# Patient Record
Sex: FEMALE | Race: WHITE | Employment: FULL TIME | URBAN - METROPOLITAN AREA
[De-identification: names, ages, dates, MRNs, and addresses within clinical notes are randomized per-mention and may not be internally consistent; named-entity substitution may affect disease eponyms.]

---

## 2017-01-18 ENCOUNTER — OFFICE VISIT (OUTPATIENT)
Dept: INTERNAL MEDICINE CLINIC | Facility: CLINIC | Age: 42
End: 2017-01-18

## 2017-01-18 VITALS
HEIGHT: 62 IN | DIASTOLIC BLOOD PRESSURE: 86 MMHG | RESPIRATION RATE: 16 BRPM | WEIGHT: 251 LBS | SYSTOLIC BLOOD PRESSURE: 130 MMHG | BODY MASS INDEX: 46.19 KG/M2 | HEART RATE: 80 BPM

## 2017-01-18 DIAGNOSIS — Z51.81 ENCOUNTER FOR THERAPEUTIC DRUG MONITORING: Primary | ICD-10-CM

## 2017-01-18 DIAGNOSIS — E66.01 MORBID OBESITY WITH BMI OF 40.0-44.9, ADULT (HCC): ICD-10-CM

## 2017-01-18 PROCEDURE — 99213 OFFICE O/P EST LOW 20 MIN: CPT | Performed by: NURSE PRACTITIONER

## 2017-01-18 RX ORDER — TOPIRAMATE 25 MG/1
25 TABLET ORAL 2 TIMES DAILY
Qty: 60 TABLET | Refills: 2 | Status: SHIPPED | OUTPATIENT
Start: 2017-01-18 | End: 2017-05-11

## 2017-01-18 RX ORDER — PHENTERMINE HYDROCHLORIDE 37.5 MG/1
37.5 TABLET ORAL
Qty: 30 TABLET | Refills: 0 | Status: SHIPPED | OUTPATIENT
Start: 2017-01-18 | End: 2017-02-23 | Stop reason: DRUGHIGH

## 2017-01-18 RX ORDER — LACTOBACIL 2/BIFIDO 1/S.THERMO 450B CELL
1 PACKET (EA) ORAL EVERY MORNING
Qty: 60 CAPSULE | Refills: 2 | Status: SHIPPED | COMMUNITY
Start: 2017-01-18 | End: 2017-03-19

## 2017-01-18 RX ORDER — PHENTERMINE HYDROCHLORIDE 37.5 MG/1
37.5 TABLET ORAL
COMMUNITY
End: 2017-01-18

## 2017-01-18 NOTE — PROGRESS NOTES
CC: Patient presents with:  Weight Check: 6 pound weight gain       HPI:   Obesity. Patient has not been in since 9/2/16 4 months as had to reschedule appt during our move and then had to cancel and did not reschedule.   She had wrong number for clin Essential hypertension     Encounter for therapeutic drug monitoring     Morbid obesity with BMI of 40.0-44.9, adult ([facility] [address]        REVIEW OF SYSTEMS:   RESPIRATORY: denies shortness of breath   CARDIOVASCULAR: denies chest pain  GI: denies constipation  S about 4 weeks (around 2/15/2017).

## 2017-01-18 NOTE — PATIENT INSTRUCTIONS
Please try to work on the following dietary changes this first month  1. Drink lots of water and cut down on soda consumption if soda drinker  2.  Eat breakfast daily and focus on protein such as greek yogurt with fruit, cottage cheese, string cheese, hard · Fiber is found in foods such as vegetables, fruits, beans, and whole grains. Unlike other carbs, fiber isn’t digested or absorbed. So it doesn’t raise blood sugar.  In fact, fiber can help keep blood sugar from rising too fast. It also helps keep blood ch Protein helps the body build and repair muscle and other tissue. Protein has little or no effect on blood sugar. However, many foods that contain protein also contain saturated fat.  By choosing low-fat protein sources, you can get the benefits of protein w

## 2017-02-23 ENCOUNTER — OFFICE VISIT (OUTPATIENT)
Dept: INTERNAL MEDICINE CLINIC | Facility: CLINIC | Age: 42
End: 2017-02-23

## 2017-02-23 VITALS
BODY MASS INDEX: 45.64 KG/M2 | WEIGHT: 248 LBS | DIASTOLIC BLOOD PRESSURE: 88 MMHG | HEART RATE: 80 BPM | HEIGHT: 62 IN | SYSTOLIC BLOOD PRESSURE: 158 MMHG | RESPIRATION RATE: 16 BRPM

## 2017-02-23 DIAGNOSIS — Z51.81 ENCOUNTER FOR THERAPEUTIC DRUG MONITORING: Primary | ICD-10-CM

## 2017-02-23 DIAGNOSIS — E66.01 MORBID OBESITY WITH BMI OF 40.0-44.9, ADULT (HCC): ICD-10-CM

## 2017-02-23 DIAGNOSIS — F43.9 STRESS: ICD-10-CM

## 2017-02-23 PROCEDURE — 99213 OFFICE O/P EST LOW 20 MIN: CPT | Performed by: NURSE PRACTITIONER

## 2017-02-23 RX ORDER — BUPROPION HYDROCHLORIDE 150 MG/1
150 TABLET, EXTENDED RELEASE ORAL 2 TIMES DAILY
Qty: 60 TABLET | Refills: 1 | Status: SHIPPED | OUTPATIENT
Start: 2017-02-23 | End: 2017-05-11

## 2017-02-23 RX ORDER — PHENTERMINE HYDROCHLORIDE 37.5 MG/1
37.5 TABLET ORAL
Qty: 30 TABLET | Refills: 0 | Status: CANCELLED | OUTPATIENT
Start: 2017-02-23

## 2017-02-23 RX ORDER — PHENTERMINE HYDROCHLORIDE 15 MG/1
15 CAPSULE ORAL EVERY MORNING
Qty: 30 CAPSULE | Refills: 0 | Status: SHIPPED | OUTPATIENT
Start: 2017-02-23 | End: 2017-05-11 | Stop reason: SINTOL

## 2017-02-23 NOTE — PROGRESS NOTES
CC: Patient presents with:  Weight Check: lost 3 pounds       HPI:   Obesiy. Patient tolerating phentermine and topamax however blood pressure still elevated and she did not see PCP since last visit but states she will schedule today.  She has had a l distress, A/O x3  HEENT:  throat is clear, PERRLA  LUNGS: clear to auscultation bilat   CARDIO: RRR without murmur, bp elevated  GI: +BS's    No orders of the defined types were placed in this encounter.         Signed Prescriptions Disp Refills    BuPROPio

## 2017-02-23 NOTE — PATIENT INSTRUCTIONS
Manage Stress with a Healthy Lifestyle  Managing stress is easier if you take good care of yourself. Make time for rest and recreation. Eat healthier meals. Take a walk now and then. And don't forget to treat yourself.  A little down time can go a long wa Exercise helps burn off the negative energy of stress. Doing something active that you enjoy also helps you get away from stressful situations. Try to walk, jog, skate, swim, dance, take a fitness class, or play a team sport on most days.  Or, practice yoga

## 2017-03-21 ENCOUNTER — TELEPHONE (OUTPATIENT)
Dept: INTERNAL MEDICINE CLINIC | Facility: CLINIC | Age: 42
End: 2017-03-21

## 2017-04-03 ENCOUNTER — TELEPHONE (OUTPATIENT)
Dept: INTERNAL MEDICINE CLINIC | Facility: CLINIC | Age: 42
End: 2017-04-03

## 2017-04-03 PROCEDURE — 87086 URINE CULTURE/COLONY COUNT: CPT | Performed by: INTERNAL MEDICINE

## 2017-04-27 ENCOUNTER — OFFICE VISIT (OUTPATIENT)
Dept: INTERNAL MEDICINE CLINIC | Facility: CLINIC | Age: 42
End: 2017-04-27

## 2017-04-27 DIAGNOSIS — E66.01 OBESITY, CLASS III, BMI 40-49.9 (MORBID OBESITY) (HCC): ICD-10-CM

## 2017-04-27 PROCEDURE — 97802 MEDICAL NUTRITION INDIV IN: CPT | Performed by: DIETITIAN, REGISTERED

## 2017-04-28 VITALS — BODY MASS INDEX: 47 KG/M2 | WEIGHT: 255 LBS

## 2017-04-28 NOTE — PROGRESS NOTES
Pt has been ill the past 2 months and when she was prescribed antibiotics she stopped taking weight loss meds.   Does not like to take multiple medications at the same time

## 2017-05-11 ENCOUNTER — OFFICE VISIT (OUTPATIENT)
Dept: INTERNAL MEDICINE CLINIC | Facility: CLINIC | Age: 42
End: 2017-05-11

## 2017-05-11 VITALS
DIASTOLIC BLOOD PRESSURE: 92 MMHG | HEIGHT: 62 IN | SYSTOLIC BLOOD PRESSURE: 138 MMHG | RESPIRATION RATE: 16 BRPM | HEART RATE: 80 BPM | WEIGHT: 249 LBS | BODY MASS INDEX: 45.82 KG/M2

## 2017-05-11 DIAGNOSIS — E66.01 MORBID OBESITY WITH BMI OF 40.0-44.9, ADULT (HCC): ICD-10-CM

## 2017-05-11 DIAGNOSIS — F43.9 STRESS: ICD-10-CM

## 2017-05-11 DIAGNOSIS — Z51.81 ENCOUNTER FOR THERAPEUTIC DRUG MONITORING: Primary | ICD-10-CM

## 2017-05-11 PROCEDURE — 99213 OFFICE O/P EST LOW 20 MIN: CPT | Performed by: NURSE PRACTITIONER

## 2017-05-11 RX ORDER — PHENTERMINE HYDROCHLORIDE 15 MG/1
15 CAPSULE ORAL EVERY MORNING
Qty: 30 CAPSULE | Refills: 0 | Status: CANCELLED | OUTPATIENT
Start: 2017-05-11 | End: 2017-06-10

## 2017-05-11 RX ORDER — TOPIRAMATE 25 MG/1
50 TABLET ORAL 2 TIMES DAILY
Qty: 120 TABLET | Refills: 2 | Status: SHIPPED | OUTPATIENT
Start: 2017-05-11 | End: 2017-08-09

## 2017-05-11 RX ORDER — BUPROPION HYDROCHLORIDE 150 MG/1
150 TABLET, EXTENDED RELEASE ORAL 2 TIMES DAILY
Qty: 60 TABLET | Refills: 1 | Status: SHIPPED | OUTPATIENT
Start: 2017-05-11 | End: 2017-07-10

## 2017-05-11 NOTE — PROGRESS NOTES
CC: Patient presents with:  Weight Check: one pound weight gain       HPI:   Obesity. Patient had been taking phentermine, topamax and wellbutrin.  She did not see PCP regarding blood pressure; she had seen another provider for back pain and they did encounter. Signed Prescriptions Disp Refills    BuPROPion HCl ER, SR, 150 MG Oral Tablet 12 Hr 60 tablet 1      Sig: Take 1 tablet (150 mg total) by mouth 2 (two) times daily.       topiramate 25 MG Oral Tab 120 tablet 2      Sig: Take 2 tablets (50

## 2017-09-20 PROCEDURE — 88305 TISSUE EXAM BY PATHOLOGIST: CPT | Performed by: RADIOLOGY

## 2018-07-07 ENCOUNTER — APPOINTMENT (OUTPATIENT)
Dept: GENERAL RADIOLOGY | Age: 43
End: 2018-07-07
Attending: EMERGENCY MEDICINE
Payer: COMMERCIAL

## 2018-07-07 ENCOUNTER — APPOINTMENT (OUTPATIENT)
Dept: CT IMAGING | Age: 43
End: 2018-07-07
Attending: EMERGENCY MEDICINE
Payer: COMMERCIAL

## 2018-07-07 ENCOUNTER — HOSPITAL ENCOUNTER (EMERGENCY)
Age: 43
Discharge: HOME | End: 2018-07-07
Attending: EMERGENCY MEDICINE
Payer: COMMERCIAL

## 2018-07-07 VITALS
TEMPERATURE: 100 F | HEIGHT: 62 IN | OXYGEN SATURATION: 98 % | DIASTOLIC BLOOD PRESSURE: 74 MMHG | BODY MASS INDEX: 40.48 KG/M2 | HEART RATE: 80 BPM | WEIGHT: 220 LBS | SYSTOLIC BLOOD PRESSURE: 138 MMHG | RESPIRATION RATE: 16 BRPM

## 2018-07-07 DIAGNOSIS — H00.011 HORDEOLUM EXTERNUM OF RIGHT UPPER EYELID: ICD-10-CM

## 2018-07-07 DIAGNOSIS — R20.2 PARESTHESIAS: Primary | ICD-10-CM

## 2018-07-07 LAB
ALBUMIN SERPL-MCNC: 3.3 G/DL (ref 3.5–4.8)
ALP LIVER SERPL-CCNC: 122 U/L (ref 37–98)
ALT SERPL-CCNC: 34 U/L (ref 14–54)
AST SERPL-CCNC: 20 U/L (ref 15–41)
ATRIAL RATE: 90 BPM
BASOPHILS # BLD AUTO: 0.04 X10(3) UL (ref 0–0.1)
BASOPHILS NFR BLD AUTO: 0.5 %
BILIRUB SERPL-MCNC: 0.3 MG/DL (ref 0.1–2)
BUN BLD-MCNC: 12 MG/DL (ref 8–20)
CALCIUM BLD-MCNC: 8.6 MG/DL (ref 8.3–10.3)
CHLORIDE: 107 MMOL/L (ref 101–111)
CO2: 25 MMOL/L (ref 22–32)
CREAT BLD-MCNC: 0.63 MG/DL (ref 0.55–1.02)
EOSINOPHIL # BLD AUTO: 0.23 X10(3) UL (ref 0–0.3)
EOSINOPHIL NFR BLD AUTO: 2.7 %
ERYTHROCYTE [DISTWIDTH] IN BLOOD BY AUTOMATED COUNT: 14.6 % (ref 11.5–16)
GLUCOSE BLD-MCNC: 116 MG/DL (ref 70–99)
HCT VFR BLD AUTO: 37.9 % (ref 34–50)
HGB BLD-MCNC: 12 G/DL (ref 12–16)
IMMATURE GRANULOCYTE COUNT: 0.04 X10(3) UL (ref 0–1)
IMMATURE GRANULOCYTE RATIO %: 0.5 %
LYMPHOCYTES # BLD AUTO: 2.19 X10(3) UL (ref 0.9–4)
LYMPHOCYTES NFR BLD AUTO: 25.4 %
M PROTEIN MFR SERPL ELPH: 7.4 G/DL (ref 6.1–8.3)
MCH RBC QN AUTO: 24.8 PG (ref 27–33.2)
MCHC RBC AUTO-ENTMCNC: 31.7 G/DL (ref 31–37)
MCV RBC AUTO: 78.3 FL (ref 81–100)
MONOCYTES # BLD AUTO: 0.54 X10(3) UL (ref 0.1–1)
MONOCYTES NFR BLD AUTO: 6.3 %
NEUTROPHIL ABS PRELIM: 5.58 X10 (3) UL (ref 1.3–6.7)
NEUTROPHILS # BLD AUTO: 5.58 X10(3) UL (ref 1.3–6.7)
NEUTROPHILS NFR BLD AUTO: 64.6 %
P AXIS: 54 DEGREES
P-R INTERVAL: 134 MS
PLATELET # BLD AUTO: 336 10(3)UL (ref 150–450)
POTASSIUM SERPL-SCNC: 3.7 MMOL/L (ref 3.6–5.1)
Q-T INTERVAL: 366 MS
QRS DURATION: 74 MS
QTC CALCULATION (BEZET): 447 MS
R AXIS: 57 DEGREES
RBC # BLD AUTO: 4.84 X10(6)UL (ref 3.8–5.1)
RED CELL DISTRIBUTION WIDTH-SD: 41.1 FL (ref 35.1–46.3)
SODIUM SERPL-SCNC: 139 MMOL/L (ref 136–144)
T AXIS: 24 DEGREES
TROPONIN: <0.046 NG/ML (ref ?–0.05)
VENTRICULAR RATE: 90 BPM
WBC # BLD AUTO: 8.6 X10(3) UL (ref 4–13)

## 2018-07-07 PROCEDURE — 80053 COMPREHEN METABOLIC PANEL: CPT | Performed by: EMERGENCY MEDICINE

## 2018-07-07 PROCEDURE — 99285 EMERGENCY DEPT VISIT HI MDM: CPT

## 2018-07-07 PROCEDURE — 70450 CT HEAD/BRAIN W/O DYE: CPT | Performed by: EMERGENCY MEDICINE

## 2018-07-07 PROCEDURE — 81025 URINE PREGNANCY TEST: CPT

## 2018-07-07 PROCEDURE — 84484 ASSAY OF TROPONIN QUANT: CPT | Performed by: EMERGENCY MEDICINE

## 2018-07-07 PROCEDURE — 36415 COLL VENOUS BLD VENIPUNCTURE: CPT

## 2018-07-07 PROCEDURE — 85025 COMPLETE CBC W/AUTO DIFF WBC: CPT | Performed by: EMERGENCY MEDICINE

## 2018-07-07 PROCEDURE — 93005 ELECTROCARDIOGRAM TRACING: CPT

## 2018-07-07 PROCEDURE — 71046 X-RAY EXAM CHEST 2 VIEWS: CPT | Performed by: EMERGENCY MEDICINE

## 2018-07-07 PROCEDURE — 93010 ELECTROCARDIOGRAM REPORT: CPT

## 2018-07-07 RX ORDER — ERYTHROMYCIN 5 MG/G
1 OINTMENT OPHTHALMIC EVERY 6 HOURS
Qty: 1 G | Refills: 0 | Status: SHIPPED | OUTPATIENT
Start: 2018-07-07 | End: 2018-07-14

## 2018-07-07 NOTE — ED INITIAL ASSESSMENT (HPI)
Pt c/o numbess to right side of body and right eye redness and swelling since she woke up this am. Sts she has had intermittent numbness x 1 year. C/O intermittent dizziness and headache x 2 weeks.  Denies headache now, just c/o body aches

## 2018-07-07 NOTE — ED PROVIDER NOTES
Patient Seen in: [facility] In [place]    History   Patient presents with:  Numbness  Tingling  Cellulitis (integumentary, infectious)    Stated Complaint: Right eye redness and swelling and right arm \"numbness and tingling\" since l HYSTEROSCOPY  3/21/12: Kathy MONDRAGON      Comment: with BAF and Cholkeri- multiple myomectomy  10/30/2013: LAPAROSCOPIC CHOLECYSTECTOMY      Comment: Procedure: LAPAROSCOPIC CHOLECYSTECTOMY;                 Surgeon: Lalit WILLARD atraumatic. NECK: Neck is supple, there is no nuchal rigidity. No carotid bruits. No masses. Trachea midline. No cervical lymphadenopathy. HEART: Regular rate and rhythm, no murmurs. LUNGS: Clear to auscultation bilaterally.   No Rales, no rhonchi, n blood work obtained. CT the brain performed which did not reveal any acute findings. On reevaluation patient denies any numbness, tingling or weakness.   Patient states she has been having intermittent episodes of numbness and tingling to different parts possible for a visit in 2 days          Medications Prescribed:  Current Discharge Medication List    START taking these medications    erythromycin 5 MG/GM Ophthalmic Ointment  Apply 1 Application to eye every 6 (six) hours.   Qty: 1 g Refills: 0

## 2018-10-05 PROCEDURE — 88305 TISSUE EXAM BY PATHOLOGIST: CPT | Performed by: RADIOLOGY

## 2018-11-09 PROCEDURE — 88175 CYTOPATH C/V AUTO FLUID REDO: CPT | Performed by: OBSTETRICS & GYNECOLOGY

## 2018-11-09 PROCEDURE — 87624 HPV HI-RISK TYP POOLED RSLT: CPT | Performed by: OBSTETRICS & GYNECOLOGY

## 2018-11-19 RX ORDER — IBUPROFEN 200 MG
400 TABLET ORAL EVERY 6 HOURS PRN
COMMUNITY
End: 2019-11-19

## 2018-11-21 ENCOUNTER — TELEPHONE (OUTPATIENT)
Dept: CT IMAGING | Facility: HOSPITAL | Age: 43
End: 2018-11-21

## 2018-11-23 ENCOUNTER — TELEPHONE (OUTPATIENT)
Dept: MAMMOGRAPHY | Facility: HOSPITAL | Age: 43
End: 2018-11-23

## 2018-11-23 NOTE — TELEPHONE ENCOUNTER
LM to call back. Attempting to reach pt to discuss wire LOC procedure done in Mammography Dept prior to surgery with Dr. Miri Hong.

## 2018-11-25 ENCOUNTER — ANESTHESIA EVENT (OUTPATIENT)
Dept: SURGERY | Facility: HOSPITAL | Age: 43
End: 2018-11-25
Payer: COMMERCIAL

## 2018-11-26 ENCOUNTER — HOSPITAL ENCOUNTER (OUTPATIENT)
Dept: MAMMOGRAPHY | Facility: HOSPITAL | Age: 43
Discharge: HOME | End: 2018-11-26
Attending: SURGERY
Payer: COMMERCIAL

## 2018-11-26 ENCOUNTER — HOSPITAL ENCOUNTER (OUTPATIENT)
Facility: HOSPITAL | Age: 43
Setting detail: HOSPITAL OUTPATIENT SURGERY
Discharge: HOME | End: 2018-11-26
Attending: SURGERY | Admitting: SURGERY
Payer: COMMERCIAL

## 2018-11-26 ENCOUNTER — ANESTHESIA (OUTPATIENT)
Dept: SURGERY | Facility: HOSPITAL | Age: 43
End: 2018-11-26
Payer: COMMERCIAL

## 2018-11-26 VITALS
TEMPERATURE: 98 F | HEIGHT: 62 IN | SYSTOLIC BLOOD PRESSURE: 142 MMHG | OXYGEN SATURATION: 98 % | DIASTOLIC BLOOD PRESSURE: 91 MMHG | WEIGHT: 276.69 LBS | BODY MASS INDEX: 50.91 KG/M2 | HEART RATE: 84 BPM | RESPIRATION RATE: 16 BRPM

## 2018-11-26 DIAGNOSIS — N63.10 BREAST MASS, RIGHT: ICD-10-CM

## 2018-11-26 PROCEDURE — 76098 X-RAY EXAM SURGICAL SPECIMEN: CPT | Performed by: SURGERY

## 2018-11-26 PROCEDURE — 88305 TISSUE EXAM BY PATHOLOGIST: CPT | Performed by: SURGERY

## 2018-11-26 PROCEDURE — 19281 PERQ DEVICE BREAST 1ST IMAG: CPT | Performed by: SURGERY

## 2018-11-26 PROCEDURE — 0HBT0ZX EXCISION OF RIGHT BREAST, OPEN APPROACH, DIAGNOSTIC: ICD-10-PCS | Performed by: SURGERY

## 2018-11-26 PROCEDURE — 81025 URINE PREGNANCY TEST: CPT | Performed by: SURGERY

## 2018-11-26 PROCEDURE — 93005 ELECTROCARDIOGRAM TRACING: CPT

## 2018-11-26 PROCEDURE — 93010 ELECTROCARDIOGRAM REPORT: CPT | Performed by: INTERNAL MEDICINE

## 2018-11-26 PROCEDURE — 88344 IMHCHEM/IMCYTCHM EA MLT ANTB: CPT | Performed by: SURGERY

## 2018-11-26 RX ORDER — DIAZEPAM 5 MG/1
5 TABLET ORAL AS NEEDED
Status: DISCONTINUED | OUTPATIENT
Start: 2018-11-26 | End: 2018-11-26 | Stop reason: HOSPADM

## 2018-11-26 RX ORDER — HYDROCODONE BITARTRATE AND ACETAMINOPHEN 5; 325 MG/1; MG/1
TABLET ORAL
Status: DISCONTINUED
Start: 2018-11-26 | End: 2018-11-26

## 2018-11-26 RX ORDER — ONDANSETRON 2 MG/ML
4 INJECTION INTRAMUSCULAR; INTRAVENOUS AS NEEDED
Status: DISCONTINUED | OUTPATIENT
Start: 2018-11-26 | End: 2018-11-26

## 2018-11-26 RX ORDER — ACETAMINOPHEN 500 MG
1000 TABLET ORAL ONCE
COMMUNITY
End: 2019-07-12 | Stop reason: ALTCHOICE

## 2018-11-26 RX ORDER — SODIUM CHLORIDE, SODIUM LACTATE, POTASSIUM CHLORIDE, CALCIUM CHLORIDE 600; 310; 30; 20 MG/100ML; MG/100ML; MG/100ML; MG/100ML
INJECTION, SOLUTION INTRAVENOUS CONTINUOUS
Status: DISCONTINUED | OUTPATIENT
Start: 2018-11-26 | End: 2018-11-26

## 2018-11-26 RX ORDER — MORPHINE SULFATE 4 MG/ML
2 INJECTION, SOLUTION INTRAMUSCULAR; INTRAVENOUS EVERY 5 MIN PRN
Status: DISCONTINUED | OUTPATIENT
Start: 2018-11-26 | End: 2018-11-26

## 2018-11-26 RX ORDER — HYDROCODONE BITARTRATE AND ACETAMINOPHEN 5; 325 MG/1; MG/1
1-2 TABLET ORAL EVERY 4 HOURS PRN
Qty: 20 TABLET | Refills: 0 | Status: SHIPPED | OUTPATIENT
Start: 2018-11-26 | End: 2019-07-12 | Stop reason: ALTCHOICE

## 2018-11-26 RX ORDER — ACETAMINOPHEN 500 MG
1000 TABLET ORAL ONCE
Status: DISCONTINUED | OUTPATIENT
Start: 2018-11-26 | End: 2018-11-26

## 2018-11-26 RX ORDER — NALOXONE HYDROCHLORIDE 0.4 MG/ML
80 INJECTION, SOLUTION INTRAMUSCULAR; INTRAVENOUS; SUBCUTANEOUS AS NEEDED
Status: DISCONTINUED | OUTPATIENT
Start: 2018-11-26 | End: 2018-11-26

## 2018-11-26 RX ORDER — LIDOCAINE HYDROCHLORIDE AND EPINEPHRINE 10; 10 MG/ML; UG/ML
INJECTION, SOLUTION INFILTRATION; PERINEURAL AS NEEDED
Status: DISCONTINUED | OUTPATIENT
Start: 2018-11-26 | End: 2018-11-26 | Stop reason: HOSPADM

## 2018-11-26 RX ORDER — SCOLOPAMINE TRANSDERMAL SYSTEM 1 MG/1
1 PATCH, EXTENDED RELEASE TRANSDERMAL ONCE
Status: DISCONTINUED | OUTPATIENT
Start: 2018-11-26 | End: 2018-11-26

## 2018-11-26 RX ORDER — MORPHINE SULFATE 4 MG/ML
INJECTION, SOLUTION INTRAMUSCULAR; INTRAVENOUS
Status: COMPLETED
Start: 2018-11-26 | End: 2018-11-26

## 2018-11-26 RX ORDER — HYDROCODONE BITARTRATE AND ACETAMINOPHEN 5; 325 MG/1; MG/1
1 TABLET ORAL EVERY 6 HOURS PRN
Status: DISCONTINUED | OUTPATIENT
Start: 2018-11-26 | End: 2018-11-26

## 2018-11-26 RX ORDER — BUPIVACAINE HYDROCHLORIDE 5 MG/ML
INJECTION, SOLUTION EPIDURAL; INTRACAUDAL AS NEEDED
Status: DISCONTINUED | OUTPATIENT
Start: 2018-11-26 | End: 2018-11-26 | Stop reason: HOSPADM

## 2018-11-26 NOTE — H&P
History & Physical Examination    Patient Name: Chandni Lam  MRN: [id number]  CSN: [id number]  YOB: 1975    Diagnosis: right breast mass        Medications Prior to Admission:  acetaminophen 500 MG Oral Tab Take 1,000 mg by mouth one time.  [facility] [address] [place] [address] MAIN OR   • LAPAROSCOPY PROCEDURE UNLISTED     • NEEDLE BIOPSY LEFT  3/1/16    U/S guided bx (4:00, 5cmfn)   • OTHER SURGICAL HISTORY      age 11 and 9 removal of something  on head   • PPTL N/A 11/7/2014    Performed by Nora Simon MD at [address] [place] [address] L+D OR

## 2018-11-26 NOTE — ANESTHESIA POSTPROCEDURE EVALUATION
[address] [facility] Patient Status:  Hospital Outpatient Surgery   Age/Gender 44year old female MRN [id number]   [facility] SURGERY Attending Kade Ba MD   Hosp Day # 0 PCP Zara Villanueva MD       Anesthesia Post-op

## 2018-11-26 NOTE — BRIEF OP NOTE
Pre-Operative Diagnosis: Breast mass, right [N63.10]     Post-Operative Diagnosis: Breast mass, right [N63.10]      Procedure Performed:   Procedure(s):  WIRE LOCALIZATION EXCISION RIGHT BREAST MASS    Surgeon(s) and Role:     * Melody Schaefer MD - [address]

## 2018-11-26 NOTE — OPERATIVE REPORT
[address]    PATIENT'S NAME: [facility]   ATTENDING PHYSICIAN: Gorge Wilson M.D. OPERATING PHYSICIAN: Gorge Wilson M.D.    PATIENT ACCOUNT#:   [de-identified]    LOCATION:  [address] [place] [address] 3 [address] 10  MEDICAL RECORD #:   [id number]       DA

## 2018-11-26 NOTE — IMAGING NOTE
Assisted Dr Robbi Mares with needle loc, wire locked into place and dressing applied, provided education and emotional support, pt tolerated procedure well. Pt transported with wire intact, via wheel chair to OR holding.

## 2019-07-18 ENCOUNTER — ANESTHESIA EVENT (OUTPATIENT)
Dept: SURGERY | Facility: HOSPITAL | Age: 44
End: 2019-07-18

## 2019-07-25 ENCOUNTER — HOSPITAL ENCOUNTER (OUTPATIENT)
Facility: HOSPITAL | Age: 44
Setting detail: HOSPITAL OUTPATIENT SURGERY
Discharge: HOME | End: 2019-07-25
Attending: OPHTHALMOLOGY | Admitting: OPHTHALMOLOGY
Payer: COMMERCIAL

## 2019-07-25 ENCOUNTER — ANESTHESIA (OUTPATIENT)
Dept: SURGERY | Facility: HOSPITAL | Age: 44
End: 2019-07-25

## 2019-07-25 VITALS
SYSTOLIC BLOOD PRESSURE: 148 MMHG | OXYGEN SATURATION: 97 % | RESPIRATION RATE: 16 BRPM | WEIGHT: 272.94 LBS | DIASTOLIC BLOOD PRESSURE: 85 MMHG | BODY MASS INDEX: 50.23 KG/M2 | HEIGHT: 62 IN | TEMPERATURE: 98 F | HEART RATE: 74 BPM

## 2019-07-25 DIAGNOSIS — H50.10 EXOTROPIA OF RIGHT EYE: ICD-10-CM

## 2019-07-25 DIAGNOSIS — H50.21 HYPOTROPIA OF RIGHT EYE: ICD-10-CM

## 2019-07-25 LAB
POCT LOT NUMBER: NORMAL
POCT URINE PREGNANCY: NEGATIVE

## 2019-07-25 PROCEDURE — 08NL0ZZ RELEASE RIGHT EXTRAOCULAR MUSCLE, OPEN APPROACH: ICD-10-PCS | Performed by: OPHTHALMOLOGY

## 2019-07-25 PROCEDURE — 81025 URINE PREGNANCY TEST: CPT | Performed by: OPHTHALMOLOGY

## 2019-07-25 PROCEDURE — 08J0XZZ INSPECTION OF RIGHT EYE, EXTERNAL APPROACH: ICD-10-PCS | Performed by: OPHTHALMOLOGY

## 2019-07-25 PROCEDURE — 08SL0ZZ REPOSITION RIGHT EXTRAOCULAR MUSCLE, OPEN APPROACH: ICD-10-PCS | Performed by: OPHTHALMOLOGY

## 2019-07-25 RX ORDER — ACETAMINOPHEN 500 MG
1000 TABLET ORAL ONCE
COMMUNITY
End: 2019-11-19

## 2019-07-25 RX ORDER — SODIUM CHLORIDE, SODIUM LACTATE, POTASSIUM CHLORIDE, CALCIUM CHLORIDE 600; 310; 30; 20 MG/100ML; MG/100ML; MG/100ML; MG/100ML
INJECTION, SOLUTION INTRAVENOUS CONTINUOUS
Status: DISCONTINUED | OUTPATIENT
Start: 2019-07-25 | End: 2019-07-25

## 2019-07-25 RX ORDER — ACETAMINOPHEN 500 MG
1000 TABLET ORAL ONCE
Status: DISCONTINUED | OUTPATIENT
Start: 2019-07-25 | End: 2019-07-25 | Stop reason: HOSPADM

## 2019-07-25 RX ORDER — DIPHENHYDRAMINE HYDROCHLORIDE 50 MG/ML
12.5 INJECTION INTRAMUSCULAR; INTRAVENOUS AS NEEDED
Status: DISCONTINUED | OUTPATIENT
Start: 2019-07-25 | End: 2019-07-25

## 2019-07-25 RX ORDER — HYDROCODONE BITARTRATE AND ACETAMINOPHEN 5; 325 MG/1; MG/1
1 TABLET ORAL AS NEEDED
Status: COMPLETED | OUTPATIENT
Start: 2019-07-25 | End: 2019-07-25

## 2019-07-25 RX ORDER — ONDANSETRON 2 MG/ML
4 INJECTION INTRAMUSCULAR; INTRAVENOUS AS NEEDED
Status: DISCONTINUED | OUTPATIENT
Start: 2019-07-25 | End: 2019-07-25

## 2019-07-25 RX ORDER — MEPERIDINE HYDROCHLORIDE 25 MG/ML
12.5 INJECTION INTRAMUSCULAR; INTRAVENOUS; SUBCUTANEOUS AS NEEDED
Status: DISCONTINUED | OUTPATIENT
Start: 2019-07-25 | End: 2019-07-25

## 2019-07-25 RX ORDER — SCOLOPAMINE TRANSDERMAL SYSTEM 1 MG/1
1 PATCH, EXTENDED RELEASE TRANSDERMAL ONCE
Status: DISCONTINUED | OUTPATIENT
Start: 2019-07-25 | End: 2019-07-25

## 2019-07-25 RX ORDER — PHENYLEPHRINE HCL 2.5 %
DROPS OPHTHALMIC (EYE) AS NEEDED
Status: DISCONTINUED | OUTPATIENT
Start: 2019-07-25 | End: 2019-07-25 | Stop reason: HOSPADM

## 2019-07-25 RX ORDER — NALOXONE HYDROCHLORIDE 0.4 MG/ML
80 INJECTION, SOLUTION INTRAMUSCULAR; INTRAVENOUS; SUBCUTANEOUS AS NEEDED
Status: DISCONTINUED | OUTPATIENT
Start: 2019-07-25 | End: 2019-07-25

## 2019-07-25 RX ORDER — BALANCED SALT SOLUTION 6.4; .75; .48; .3; 3.9; 1.7 MG/ML; MG/ML; MG/ML; MG/ML; MG/ML; MG/ML
SOLUTION OPHTHALMIC AS NEEDED
Status: DISCONTINUED | OUTPATIENT
Start: 2019-07-25 | End: 2019-07-25 | Stop reason: HOSPADM

## 2019-07-25 RX ORDER — HYDROMORPHONE HYDROCHLORIDE 1 MG/ML
0.4 INJECTION, SOLUTION INTRAMUSCULAR; INTRAVENOUS; SUBCUTANEOUS EVERY 5 MIN PRN
Status: DISCONTINUED | OUTPATIENT
Start: 2019-07-25 | End: 2019-07-25

## 2019-07-25 RX ORDER — HYDROCODONE BITARTRATE AND ACETAMINOPHEN 5; 325 MG/1; MG/1
2 TABLET ORAL AS NEEDED
Status: COMPLETED | OUTPATIENT
Start: 2019-07-25 | End: 2019-07-25

## 2019-07-25 RX ORDER — SCOLOPAMINE TRANSDERMAL SYSTEM 1 MG/1
PATCH, EXTENDED RELEASE TRANSDERMAL
Status: DISCONTINUED
Start: 2019-07-25 | End: 2019-07-25

## 2019-07-25 NOTE — BRIEF OP NOTE
Pre-Operative Diagnosis: Exotropia of right eye [H50.10]  Hypotropia of right eye [H50.21]     Post-Operative Diagnosis: Exotropia of right eye [H50.10]Hypotropia of right eye [H50.21]      Procedure Performed:   Procedure(s):  RIGHT EYE EXPLORATION  AND R

## 2019-07-25 NOTE — ANESTHESIA PREPROCEDURE EVALUATION
PRE-OP EVALUATION    Patient Name: Pia Allred    Pre-op Diagnosis: Exotropia of right eye [H50.10]  Hypotropia of right eye [H50.21]    Procedure(s):  RIGHT LATERAL RECTUS RECESSION AND RIGHT MEDIAL RECTUS RESECTION    Surgeon(s) and Role:     * Sin Performed by Mario Lino MD at [facility] MAIN OR   • LAPAROSCOPY PROCEDURE UNLISTED     • NEEDLE BIOPSY LEFT  3/1/16    U/S guided bx (4:00, 5cmfn)   • OTHER SURGICAL HISTORY      age 11 and 9 removal of something  on head   • OTHER SURGICAL HISTORY  11/26/201

## 2019-07-25 NOTE — ANESTHESIA POSTPROCEDURE EVALUATION
[address] [facility] Patient Status:  Hospital Outpatient Surgery   Age/Gender 38year old female MRN [id number]   Location [facility] Attending Angeline Maier MD   Hosp Day # 0 PCP kSyler Diana MD       A

## 2019-07-25 NOTE — INTERVAL H&P NOTE
Pre-op Diagnosis: Exotropia of right eye [H50.10]  Hypotropia of right eye [H50.21]    The above referenced H&P was reviewed by Ayad Cunningham MD on 7/25/2019, the patient was examined and no significant changes have occurred in the patient's condition sinc

## 2019-07-25 NOTE — DISCHARGE SUMMARY
Outpatient Surgery Brief Discharge Summary         Patient ID:  Ronan Morris  [id number]  38year old  6/28/1975    Discharge Diagnoses: Exotropia of right eye [H50.10]Hypotropia of right eye [H50.21]    Procedures: Right eye exploration and right medial re

## 2019-07-25 NOTE — OPERATIVE REPORT
Surgeon: Genaro Kennedy MD  Pre-Operative Diagnosis: right exotropia, right hypotropia  Post-Operative Diagnosis: same  Operation Performed:  1) right eye exploration  2) scar tissue release  3) right eye forced ductions   4) right medial rectus advancement isolated first with a Clemens hook and then with a Green hook.  The muscle was found to be at a position 13.0 mm posterior to the limbus, suggesting a prior recession of 7.5 mm had been performed (assuming a natural insertion site of 5.5 mm posterior to the

## 2019-11-19 PROBLEM — R06.00 DYSPNEA ON EXERTION: Status: ACTIVE | Noted: 2019-11-19

## 2019-11-19 PROBLEM — D50.9 IRON DEFICIENCY ANEMIA, UNSPECIFIED IRON DEFICIENCY ANEMIA TYPE: Status: ACTIVE | Noted: 2019-11-19

## 2019-11-19 PROBLEM — G44.229 CHRONIC TENSION-TYPE HEADACHE, NOT INTRACTABLE: Status: ACTIVE | Noted: 2019-11-19

## 2019-11-19 PROBLEM — F41.9 ANXIETY: Status: ACTIVE | Noted: 2019-11-19

## 2019-11-19 PROBLEM — R79.89 LFT ELEVATION: Status: ACTIVE | Noted: 2019-11-19

## 2019-11-19 PROBLEM — R06.09 DYSPNEA ON EXERTION: Status: ACTIVE | Noted: 2019-11-19

## 2019-11-19 PROBLEM — R73.01 IFG (IMPAIRED FASTING GLUCOSE): Status: ACTIVE | Noted: 2019-11-19

## 2019-11-26 ENCOUNTER — HOSPITAL ENCOUNTER (EMERGENCY)
Facility: HOSPITAL | Age: 44
Discharge: HOME | End: 2019-11-26
Attending: EMERGENCY MEDICINE
Payer: COMMERCIAL

## 2019-11-26 VITALS
OXYGEN SATURATION: 100 % | RESPIRATION RATE: 25 BRPM | HEIGHT: 62 IN | SYSTOLIC BLOOD PRESSURE: 128 MMHG | TEMPERATURE: 98 F | HEART RATE: 75 BPM | WEIGHT: 270 LBS | BODY MASS INDEX: 49.69 KG/M2 | DIASTOLIC BLOOD PRESSURE: 75 MMHG

## 2019-11-26 DIAGNOSIS — K52.9 ENTERITIS: Primary | ICD-10-CM

## 2019-11-26 PROCEDURE — 99284 EMERGENCY DEPT VISIT MOD MDM: CPT

## 2019-11-26 PROCEDURE — 83690 ASSAY OF LIPASE: CPT | Performed by: EMERGENCY MEDICINE

## 2019-11-26 PROCEDURE — 80053 COMPREHEN METABOLIC PANEL: CPT | Performed by: EMERGENCY MEDICINE

## 2019-11-26 PROCEDURE — 85025 COMPLETE CBC W/AUTO DIFF WBC: CPT | Performed by: EMERGENCY MEDICINE

## 2019-11-26 PROCEDURE — 96360 HYDRATION IV INFUSION INIT: CPT

## 2019-11-26 PROCEDURE — 96361 HYDRATE IV INFUSION ADD-ON: CPT

## 2019-11-26 RX ORDER — ONDANSETRON 4 MG/1
4 TABLET, ORALLY DISINTEGRATING ORAL EVERY 4 HOURS PRN
Qty: 10 TABLET | Refills: 0 | Status: SHIPPED | OUTPATIENT
Start: 2019-11-26 | End: 2019-12-03

## 2019-11-26 NOTE — ED INITIAL ASSESSMENT (HPI)
Pt c/o black colored diarrhea since yesterday. Pt c/o feeling weak and lightheaded. Denies c/o abdominal pain.

## 2019-11-26 NOTE — ED PROVIDER NOTES
Patient Seen in: [facility] Emergency Department      History   Patient presents with:  Nausea/Vomiting/Diarrhea (gastrointestinal)    Stated Complaint: diarrhea since yesterday morning    HPI    Patient is a 60-year-old female who woke up about 4 A RESECTION/ RESESSION Right 7/25/2019    Performed by Celi Velazco MD at [facility] MAIN OR   • HYSTEROSCOPY     • HYSTEROSCOPY,LYSIS INTRAUTER [address]  3/21/12    with BAF and Cholkeri- multiple myomectomy   • LAPAROSCOPIC CHOLECYSTECTOMY N/A 10/30/2013    Perfor are normal.      Palpations: Abdomen is soft. Comments: Nontender/nondistended. Musculoskeletal: Normal range of motion. Skin:     General: Skin is warm and dry.    Neurological:      Mental Status: She is alert and oriented to person, place, and t She is comfortable going home.         Disposition and Plan     Clinical Impression:  Enteritis  (primary encounter diagnosis)    Disposition:  Discharge  11/26/2019  4:44 pm    Follow-up:  Rosa Rausch MD  [address] [place] [address]

## 2019-12-19 PROBLEM — H61.21 IMPACTED CERUMEN OF RIGHT EAR: Status: ACTIVE | Noted: 2019-12-19

## 2019-12-19 PROBLEM — M54.2 NECK PAIN, MUSCULOSKELETAL: Status: ACTIVE | Noted: 2019-12-19

## 2019-12-19 PROBLEM — R10.9 FLANK PAIN: Status: ACTIVE | Noted: 2019-12-19

## 2020-06-06 PROBLEM — R73.03 PREDIABETES: Status: ACTIVE | Noted: 2020-06-06

## 2020-06-06 PROBLEM — D64.9 ANEMIA, UNSPECIFIED TYPE: Status: ACTIVE | Noted: 2020-06-06

## 2020-06-06 PROBLEM — R05.3 CHRONIC COUGH: Status: ACTIVE | Noted: 2020-06-06

## 2020-06-06 PROBLEM — N92.0 MENORRHAGIA WITH REGULAR CYCLE: Status: ACTIVE | Noted: 2020-06-06

## 2020-11-24 ENCOUNTER — HOSPITAL ENCOUNTER (EMERGENCY)
Facility: HOSPITAL | Age: 45
Discharge: HOME | End: 2020-11-24
Attending: EMERGENCY MEDICINE
Payer: COMMERCIAL

## 2020-11-24 ENCOUNTER — APPOINTMENT (OUTPATIENT)
Dept: GENERAL RADIOLOGY | Facility: HOSPITAL | Age: 45
End: 2020-11-24
Attending: EMERGENCY MEDICINE
Payer: COMMERCIAL

## 2020-11-24 ENCOUNTER — APPOINTMENT (OUTPATIENT)
Dept: GENERAL RADIOLOGY | Facility: HOSPITAL | Age: 45
End: 2020-11-24
Payer: COMMERCIAL

## 2020-11-24 VITALS
BODY MASS INDEX: 44.71 KG/M2 | WEIGHT: 246.06 LBS | HEART RATE: 108 BPM | SYSTOLIC BLOOD PRESSURE: 155 MMHG | DIASTOLIC BLOOD PRESSURE: 76 MMHG | RESPIRATION RATE: 22 BRPM | TEMPERATURE: 101 F | OXYGEN SATURATION: 97 % | HEIGHT: 62.01 IN

## 2020-11-24 DIAGNOSIS — J12.82 PNEUMONIA DUE TO COVID-19 VIRUS: Primary | ICD-10-CM

## 2020-11-24 DIAGNOSIS — D64.9 ANEMIA, UNSPECIFIED TYPE: ICD-10-CM

## 2020-11-24 DIAGNOSIS — U07.1 PNEUMONIA DUE TO COVID-19 VIRUS: Primary | ICD-10-CM

## 2020-11-24 PROCEDURE — 82550 ASSAY OF CK (CPK): CPT | Performed by: EMERGENCY MEDICINE

## 2020-11-24 PROCEDURE — 99285 EMERGENCY DEPT VISIT HI MDM: CPT

## 2020-11-24 PROCEDURE — 99284 EMERGENCY DEPT VISIT MOD MDM: CPT

## 2020-11-24 PROCEDURE — 81001 URINALYSIS AUTO W/SCOPE: CPT | Performed by: EMERGENCY MEDICINE

## 2020-11-24 PROCEDURE — 99453 REM MNTR PHYSIOL PARAM SETUP: CPT

## 2020-11-24 PROCEDURE — 83880 ASSAY OF NATRIURETIC PEPTIDE: CPT | Performed by: EMERGENCY MEDICINE

## 2020-11-24 PROCEDURE — 96361 HYDRATE IV INFUSION ADD-ON: CPT

## 2020-11-24 PROCEDURE — 36415 COLL VENOUS BLD VENIPUNCTURE: CPT

## 2020-11-24 PROCEDURE — 84145 PROCALCITONIN (PCT): CPT | Performed by: EMERGENCY MEDICINE

## 2020-11-24 PROCEDURE — 84484 ASSAY OF TROPONIN QUANT: CPT | Performed by: EMERGENCY MEDICINE

## 2020-11-24 PROCEDURE — 80053 COMPREHEN METABOLIC PANEL: CPT | Performed by: EMERGENCY MEDICINE

## 2020-11-24 PROCEDURE — 80053 COMPREHEN METABOLIC PANEL: CPT

## 2020-11-24 PROCEDURE — 86140 C-REACTIVE PROTEIN: CPT | Performed by: EMERGENCY MEDICINE

## 2020-11-24 PROCEDURE — 85379 FIBRIN DEGRADATION QUANT: CPT | Performed by: EMERGENCY MEDICINE

## 2020-11-24 PROCEDURE — 96374 THER/PROPH/DIAG INJ IV PUSH: CPT

## 2020-11-24 PROCEDURE — 85025 COMPLETE CBC W/AUTO DIFF WBC: CPT

## 2020-11-24 PROCEDURE — 82728 ASSAY OF FERRITIN: CPT | Performed by: EMERGENCY MEDICINE

## 2020-11-24 PROCEDURE — 71045 X-RAY EXAM CHEST 1 VIEW: CPT

## 2020-11-24 PROCEDURE — 83615 LACTATE (LD) (LDH) ENZYME: CPT | Performed by: EMERGENCY MEDICINE

## 2020-11-24 PROCEDURE — 85025 COMPLETE CBC W/AUTO DIFF WBC: CPT | Performed by: EMERGENCY MEDICINE

## 2020-11-24 RX ORDER — KETOROLAC TROMETHAMINE 30 MG/ML
15 INJECTION, SOLUTION INTRAMUSCULAR; INTRAVENOUS ONCE
Status: COMPLETED | OUTPATIENT
Start: 2020-11-24 | End: 2020-11-24

## 2020-11-24 RX ORDER — ACETAMINOPHEN 500 MG
1000 TABLET ORAL ONCE
Status: COMPLETED | OUTPATIENT
Start: 2020-11-24 | End: 2020-11-24

## 2020-11-24 RX ORDER — SODIUM CHLORIDE 9 MG/ML
INJECTION, SOLUTION INTRAVENOUS CONTINUOUS
Status: DISCONTINUED | OUTPATIENT
Start: 2020-11-24 | End: 2020-11-24

## 2020-11-24 NOTE — ED PROVIDER NOTES
Patient Seen in: [facility] Emergency Department      History   Patient presents with:  Covid  Nausea/Vomiting/Diarrhea  Cough/URI  Fever    Stated Complaint: COVID positive 11/17; vomiting, diarrhea, fevers, body aches, headache, cough t*    HPI RESESSION Right 7/25/2019    Performed by Rosa Vincent MD at [facility] MAIN OR   • HYSTEROSCOPY     • HYSTEROSCOPY,LYSIS INTRAUTER [address] [place]  3/21/12    with BAF and Cholkeri- multiple myomectomy   • LAPAROSCOPIC CHOLECYSTECTOMY N/A 10/30/2013    Performed by Bradly Camarena COMP METABOLIC PANEL (14) - Abnormal; Notable for the following components:       Result Value    Potassium 3.3 (*)     Creatinine 0.54 (*)     BUN/CREA Ratio 24.1 (*)     Calcium, Total 8.2 (*)     AST 39 (*)     Alkaline Phosphatase 104 (*)     Albumin RAINBOW DRAW BLUE   RAINBOW DRAW LAVENDER   RAINBOW DRAW LIGHT GREEN   RAINBOW DRAW GOLD   BLOOD CULTURE   BLOOD CULTURE   SPUTUM CULTURE            Xr Chest Ap Portable  (cpt=71045)    Result Date: 11/24/2020  PROCEDURE:  XR CHEST AP PORTABLE  (CPT=7104 patient has evidence of COVID-19 pneumonia and low oxygen saturation. There is concern for gradual decline at home.  As a result, a pulse oximetry device was given to the patient/caregiver and clear instructions relayed on how to use the device, interpret t

## 2021-03-16 PROBLEM — E55.9 VITAMIN D DEFICIENCY: Status: ACTIVE | Noted: 2021-03-16

## 2021-03-16 PROBLEM — N93.8 DUB (DYSFUNCTIONAL UTERINE BLEEDING): Status: ACTIVE | Noted: 2021-03-16

## 2021-06-01 ENCOUNTER — HOSPITAL ENCOUNTER (OUTPATIENT)
Facility: HOSPITAL | Age: 46
Setting detail: OBSERVATION
Discharge: HOME | End: 2021-06-02
Attending: EMERGENCY MEDICINE | Admitting: INTERNAL MEDICINE
Payer: COMMERCIAL

## 2021-06-01 ENCOUNTER — APPOINTMENT (OUTPATIENT)
Dept: GENERAL RADIOLOGY | Facility: HOSPITAL | Age: 46
End: 2021-06-01
Attending: HOSPITALIST
Payer: COMMERCIAL

## 2021-06-01 DIAGNOSIS — D64.9 SYMPTOMATIC ANEMIA: Primary | ICD-10-CM

## 2021-06-01 PROCEDURE — 86920 COMPATIBILITY TEST SPIN: CPT

## 2021-06-01 PROCEDURE — 81003 URINALYSIS AUTO W/O SCOPE: CPT | Performed by: EMERGENCY MEDICINE

## 2021-06-01 PROCEDURE — 96361 HYDRATE IV INFUSION ADD-ON: CPT

## 2021-06-01 PROCEDURE — 83550 IRON BINDING TEST: CPT | Performed by: EMERGENCY MEDICINE

## 2021-06-01 PROCEDURE — 30233H1 TRANSFUSION OF NONAUTOLOGOUS WHOLE BLOOD INTO PERIPHERAL VEIN, PERCUTANEOUS APPROACH: ICD-10-PCS | Performed by: INTERNAL MEDICINE

## 2021-06-01 PROCEDURE — 36430 TRANSFUSION BLD/BLD COMPNT: CPT

## 2021-06-01 PROCEDURE — 85025 COMPLETE CBC W/AUTO DIFF WBC: CPT | Performed by: EMERGENCY MEDICINE

## 2021-06-01 PROCEDURE — 86850 RBC ANTIBODY SCREEN: CPT | Performed by: EMERGENCY MEDICINE

## 2021-06-01 PROCEDURE — 71046 X-RAY EXAM CHEST 2 VIEWS: CPT | Performed by: HOSPITALIST

## 2021-06-01 PROCEDURE — 96360 HYDRATION IV INFUSION INIT: CPT

## 2021-06-01 PROCEDURE — 99291 CRITICAL CARE FIRST HOUR: CPT

## 2021-06-01 PROCEDURE — 86901 BLOOD TYPING SEROLOGIC RH(D): CPT | Performed by: EMERGENCY MEDICINE

## 2021-06-01 PROCEDURE — 85018 HEMOGLOBIN: CPT | Performed by: INTERNAL MEDICINE

## 2021-06-01 PROCEDURE — 81025 URINE PREGNANCY TEST: CPT

## 2021-06-01 PROCEDURE — 83540 ASSAY OF IRON: CPT | Performed by: EMERGENCY MEDICINE

## 2021-06-01 PROCEDURE — 86900 BLOOD TYPING SEROLOGIC ABO: CPT | Performed by: EMERGENCY MEDICINE

## 2021-06-01 PROCEDURE — 80053 COMPREHEN METABOLIC PANEL: CPT | Performed by: EMERGENCY MEDICINE

## 2021-06-01 PROCEDURE — 87040 BLOOD CULTURE FOR BACTERIA: CPT | Performed by: HOSPITALIST

## 2021-06-01 PROCEDURE — 82728 ASSAY OF FERRITIN: CPT | Performed by: EMERGENCY MEDICINE

## 2021-06-01 RX ORDER — ACETAMINOPHEN 160 MG/5ML
650 SOLUTION ORAL EVERY 4 HOURS PRN
Status: DISCONTINUED | OUTPATIENT
Start: 2021-06-01 | End: 2021-06-02

## 2021-06-01 RX ORDER — LOSARTAN POTASSIUM AND HYDROCHLOROTHIAZIDE 25; 100 MG/1; MG/1
1 TABLET ORAL DAILY
Status: DISCONTINUED | OUTPATIENT
Start: 2021-06-01 | End: 2021-06-01 | Stop reason: SDUPTHER

## 2021-06-01 RX ORDER — ACETAMINOPHEN 160 MG/5ML
325 SOLUTION ORAL EVERY 4 HOURS PRN
Status: DISCONTINUED | OUTPATIENT
Start: 2021-06-01 | End: 2021-06-02

## 2021-06-01 RX ORDER — ACETAMINOPHEN 500 MG
1000 TABLET ORAL ONCE
Status: COMPLETED | OUTPATIENT
Start: 2021-06-01 | End: 2021-06-01

## 2021-06-01 RX ORDER — ERGOCALCIFEROL (VITAMIN D2) 1250 MCG
50000 CAPSULE ORAL WEEKLY
COMMUNITY
Start: 2021-05-25 | End: 2021-11-03

## 2021-06-01 RX ORDER — IBUPROFEN 200 MG
600 TABLET ORAL EVERY 6 HOURS PRN
COMMUNITY
End: 2021-11-03

## 2021-06-01 RX ORDER — ACETAMINOPHEN 500 MG
TABLET ORAL
Status: COMPLETED
Start: 2021-06-01 | End: 2021-06-01

## 2021-06-01 RX ORDER — HYDRALAZINE HYDROCHLORIDE 20 MG/ML
10 INJECTION INTRAMUSCULAR; INTRAVENOUS EVERY 8 HOURS PRN
Status: DISCONTINUED | OUTPATIENT
Start: 2021-06-01 | End: 2021-06-02

## 2021-06-01 RX ORDER — PANTOPRAZOLE SODIUM 40 MG/1
40 TABLET, DELAYED RELEASE ORAL
Status: DISCONTINUED | OUTPATIENT
Start: 2021-06-02 | End: 2021-06-02

## 2021-06-01 NOTE — ED INITIAL ASSESSMENT (HPI)
Chronic vag bleeding d/t fibroids, hysterectomy scheduled. Bleeding worse x 2 days with very large clots, shortness of breath, fatigue, and dizziness.

## 2021-06-02 VITALS
HEART RATE: 86 BPM | SYSTOLIC BLOOD PRESSURE: 158 MMHG | DIASTOLIC BLOOD PRESSURE: 77 MMHG | TEMPERATURE: 98 F | HEIGHT: 62 IN | BODY MASS INDEX: 47.59 KG/M2 | RESPIRATION RATE: 14 BRPM | WEIGHT: 258.63 LBS | OXYGEN SATURATION: 98 %

## 2021-06-02 PROCEDURE — 85018 HEMOGLOBIN: CPT | Performed by: INTERNAL MEDICINE

## 2021-06-02 PROCEDURE — 80048 BASIC METABOLIC PNL TOTAL CA: CPT | Performed by: HOSPITALIST

## 2021-06-02 PROCEDURE — 36430 TRANSFUSION BLD/BLD COMPNT: CPT

## 2021-06-02 PROCEDURE — 85027 COMPLETE CBC AUTOMATED: CPT | Performed by: HOSPITALIST

## 2021-06-02 RX ORDER — SODIUM CHLORIDE 9 MG/ML
INJECTION, SOLUTION INTRAVENOUS ONCE
Status: COMPLETED | OUTPATIENT
Start: 2021-06-02 | End: 2021-06-02

## 2021-06-02 RX ORDER — MELATONIN
325 2 TIMES DAILY WITH MEALS
Qty: 60 TABLET | Refills: 0 | Status: SHIPPED | OUTPATIENT
Start: 2021-06-02 | End: 2021-11-03

## 2021-06-02 RX ORDER — KETOROLAC TROMETHAMINE 30 MG/ML
30 INJECTION, SOLUTION INTRAMUSCULAR; INTRAVENOUS EVERY 6 HOURS PRN
Status: DISCONTINUED | OUTPATIENT
Start: 2021-06-02 | End: 2021-06-02

## 2021-06-02 RX ORDER — KETOROLAC TROMETHAMINE 15 MG/ML
15 INJECTION, SOLUTION INTRAMUSCULAR; INTRAVENOUS EVERY 6 HOURS PRN
Status: DISCONTINUED | OUTPATIENT
Start: 2021-06-02 | End: 2021-06-02

## 2021-06-02 RX ORDER — DOCUSATE SODIUM 100 MG/1
100 CAPSULE, LIQUID FILLED ORAL 2 TIMES DAILY PRN
Qty: 60 CAPSULE | Refills: 0 | Status: SHIPPED | COMMUNITY
Start: 2021-06-02 | End: 2021-11-03

## 2021-06-02 NOTE — PROGRESS NOTES
NURSING ADMISSION NOTE      Patient admitted via [address] to room, 520. Safety precautions initiated. Bed in low position. Call light in reach. Admission navigator/PTA medication list complete. Chronic hx of severe menorrhagia. Hgb 5.9.  On

## 2021-06-02 NOTE — H&P
[facility] Hospitalist History and Physical      Patient presents with:  Difficulty Breathing  Fatigue       PCP: Zara Villanueva MD      History of Present Illness: Patient is a 41year old female with PMH sig for severe menorrhagia, HTN , GERD, and morbi 10/30/2013    Procedure: LAPAROSCOPIC CHOLECYSTECTOMY;  Surgeon: Ollie Moreno;   Location: [facility] MAIN OR   • NEEDLE BIOPSY LEFT  3/1/16    U/S guided bx (4:00, 5cmfn)   • OTHER SURGICAL HISTORY      age 11 and 9 removal of something  on head   • OTHER SURGICA nontender, nondistended, no organomegaly, bowel sounds present. Morbidly obese.    MSK: Full range of motion in extremities, no clubbing, no cyanosis  Skin: no rashes or lesions  Neuro:  Grossly intact, no focal deficits      Data Review:    LABS:   Lab Re ferrous sulfate 325 mg po BID w/ meals at home  - f/u with OB/GYN for hysterectomy     # Acute on chronic low back pain  - pt with lumbar DJD noted as early as 2012, has had ELSLI in the past  - severe anemia likely exacerbating her symptoms   - recommend tr

## 2021-06-02 NOTE — H&P
SIGNIFICANT HISTORY:     Medical History: ·   Past Medical History:   Diagnosis Date   • Anesthesia complication     passed out post surgery   • Chronic rhinitis    • Difficult intubation     was told mouth is too small,old anesthesia record under media Never used    Substance and Sexual Activity      Alcohol use: No      Drug use: No      Sexual activity: Yes        Partners: Male        Birth control/protection: Tubal Ligation    Other Topics      Concerns:        Not on file    Social History Narrative incontinence  MUSCULOSKELETAL: no joint complaints upper or lower extremities  PSYCHE: no symptoms of depression or anxiety  HEMATOLOGY: denies hx anemia; denies bruising or excessive bleeding  ENDOCRINE:  denies unexpected wt gain or wt loss  ALLERGY/IMM.

## 2021-06-02 NOTE — ED PROVIDER NOTES
Patient Seen in: [facility] Emergency Department      History   Patient presents with:  Difficulty Breathing  Fatigue    Stated Complaint: shortness of breath with exertion, fatigue, history of low HgB    HPI/Subjective:   HPI    80-year-old female ADHSNS  3/21/12    with BAF and Cholkeri- multiple myomectomy   • LAPAROSCOPIC CHOLECYSTECTOMY  10/30/2013    Procedure: LAPAROSCOPIC CHOLECYSTECTOMY;  Surgeon: Perry Kimbrough;   Location: [address] [place] [address]   • NEEDLE BIOPSY LEFT  3/1/16    U/S guided bx (4:00, 5c components within normal limits   URINALYSIS WITH CULTURE REFLEX - Abnormal; Notable for the following components:    Squamous Epi.  Cells Few (*)     All other components within normal limits   CBC W/ DIFFERENTIAL - Abnormal; Notable for the following comp transfusion in the ER. This involved direct patient intervention, complex decision making, and/or extensive discussions with the patient, family, and clinical staff. MDM      52-year-old female presents to the ER with symptomatic anemia.   She is tac

## 2021-06-02 NOTE — DISCHARGE PLANNING
NURSING DISCHARGE NOTE    Discharged Home via Wheelchair. Accompanied by Spouse and Support staff  Belongings Taken by patient/family. Discharge navigator complete. Discharge instructions reviewed with patient & spouse at bedside.   All questions & co

## 2021-06-03 ENCOUNTER — PATIENT OUTREACH (OUTPATIENT)
Dept: CASE MANAGEMENT | Age: 46
End: 2021-06-03

## 2021-06-03 NOTE — PROGRESS NOTES
1st attempt PCP apt request    [facility]  [address] [place] [address]  [phone]  Apt made Tues.  June 8th @3pm

## 2021-06-07 RX ORDER — ACETAMINOPHEN 500 MG
1000 TABLET ORAL ONCE
Status: CANCELLED | OUTPATIENT
Start: 2021-06-07 | End: 2021-06-07

## 2021-06-15 ENCOUNTER — LAB ENCOUNTER (OUTPATIENT)
Dept: LAB | Facility: HOSPITAL | Age: 46
End: 2021-06-15
Attending: OBSTETRICS & GYNECOLOGY
Payer: COMMERCIAL

## 2021-06-15 DIAGNOSIS — Z01.818 PRE-OP TESTING: ICD-10-CM

## 2021-06-17 NOTE — H&P
[facility]    History and Physical    Taylor Dao Patient Status:  Hospital Outpatient Surgery    1975 MRN [id number]   [facility] SURGERY Attending Felicia Baker MD   Hosp Day # 0 PCP Lucille Dawson MD     Date:   OTHER SURGICAL HISTORY      age 11 and 7 removal of something  on head   • OTHER SURGICAL HISTORY  11/26/2018    Wire loc exc right breast mass   • TUBAL LIGATION       Family History   Problem Relation Age of Onset   • Heart Disorder Paternal Grandmother RPR Nonreactive 11/07/2014    B12 439 06/06/2020     No results found. Assessment/Plan:     * No active hospital problems.  *  Plan total laparoscopic hysterectomy, bilateral salpingectomy, cystoscopy  Ancef 2 g  SCDs            Vika Biswas MD  6

## 2021-06-18 ENCOUNTER — HOSPITAL ENCOUNTER (OUTPATIENT)
Facility: HOSPITAL | Age: 46
Setting detail: HOSPITAL OUTPATIENT SURGERY
Discharge: HOME | End: 2021-06-18
Attending: OBSTETRICS & GYNECOLOGY | Admitting: OBSTETRICS & GYNECOLOGY
Payer: COMMERCIAL

## 2021-06-18 ENCOUNTER — ANESTHESIA (OUTPATIENT)
Dept: SURGERY | Facility: HOSPITAL | Age: 46
End: 2021-06-18
Payer: COMMERCIAL

## 2021-06-18 ENCOUNTER — ANESTHESIA EVENT (OUTPATIENT)
Dept: SURGERY | Facility: HOSPITAL | Age: 46
End: 2021-06-18
Payer: COMMERCIAL

## 2021-06-18 VITALS
RESPIRATION RATE: 16 BRPM | OXYGEN SATURATION: 94 % | SYSTOLIC BLOOD PRESSURE: 157 MMHG | HEIGHT: 62 IN | HEART RATE: 64 BPM | DIASTOLIC BLOOD PRESSURE: 80 MMHG | TEMPERATURE: 98 F | BODY MASS INDEX: 46.78 KG/M2 | WEIGHT: 254.19 LBS

## 2021-06-18 DIAGNOSIS — Z01.818 PRE-OP TESTING: Primary | ICD-10-CM

## 2021-06-18 PROBLEM — D21.9 FIBROIDS: Status: ACTIVE | Noted: 2021-06-18

## 2021-06-18 PROCEDURE — 0UB74ZZ EXCISION OF BILATERAL FALLOPIAN TUBES, PERCUTANEOUS ENDOSCOPIC APPROACH: ICD-10-PCS | Performed by: OBSTETRICS & GYNECOLOGY

## 2021-06-18 PROCEDURE — 81025 URINE PREGNANCY TEST: CPT

## 2021-06-18 PROCEDURE — 88307 TISSUE EXAM BY PATHOLOGIST: CPT | Performed by: OBSTETRICS & GYNECOLOGY

## 2021-06-18 PROCEDURE — 0UT94ZZ RESECTION OF UTERUS, PERCUTANEOUS ENDOSCOPIC APPROACH: ICD-10-PCS | Performed by: OBSTETRICS & GYNECOLOGY

## 2021-06-18 RX ORDER — SCOLOPAMINE TRANSDERMAL SYSTEM 1 MG/1
1 PATCH, EXTENDED RELEASE TRANSDERMAL
Status: DISCONTINUED | OUTPATIENT
Start: 2021-06-18 | End: 2021-06-21 | Stop reason: HOSPADM

## 2021-06-18 RX ORDER — ROCURONIUM BROMIDE 10 MG/ML
INJECTION, SOLUTION INTRAVENOUS AS NEEDED
Status: DISCONTINUED | OUTPATIENT
Start: 2021-06-18 | End: 2021-06-18 | Stop reason: SURG

## 2021-06-18 RX ORDER — NALOXONE HYDROCHLORIDE 0.4 MG/ML
80 INJECTION, SOLUTION INTRAMUSCULAR; INTRAVENOUS; SUBCUTANEOUS AS NEEDED
Status: DISCONTINUED | OUTPATIENT
Start: 2021-06-18 | End: 2021-06-18

## 2021-06-18 RX ORDER — METOCLOPRAMIDE HYDROCHLORIDE 5 MG/ML
10 INJECTION INTRAMUSCULAR; INTRAVENOUS AS NEEDED
Status: DISCONTINUED | OUTPATIENT
Start: 2021-06-18 | End: 2021-06-18

## 2021-06-18 RX ORDER — ACETAMINOPHEN 500 MG
500 TABLET ORAL EVERY 6 HOURS PRN
COMMUNITY
End: 2022-01-12

## 2021-06-18 RX ORDER — DIPHENHYDRAMINE HYDROCHLORIDE 50 MG/ML
12.5 INJECTION INTRAMUSCULAR; INTRAVENOUS AS NEEDED
Status: DISCONTINUED | OUTPATIENT
Start: 2021-06-18 | End: 2021-06-18

## 2021-06-18 RX ORDER — HYDROCODONE BITARTRATE AND ACETAMINOPHEN 5; 325 MG/1; MG/1
1-2 TABLET ORAL EVERY 4 HOURS PRN
Qty: 30 TABLET | Refills: 0 | Status: SHIPPED | OUTPATIENT
Start: 2021-06-18 | End: 2021-11-03

## 2021-06-18 RX ORDER — NEOSTIGMINE METHYLSULFATE 1 MG/ML
INJECTION INTRAVENOUS AS NEEDED
Status: DISCONTINUED | OUTPATIENT
Start: 2021-06-18 | End: 2021-06-18 | Stop reason: SURG

## 2021-06-18 RX ORDER — MIDAZOLAM HYDROCHLORIDE 1 MG/ML
1 INJECTION INTRAMUSCULAR; INTRAVENOUS EVERY 5 MIN PRN
Status: DISCONTINUED | OUTPATIENT
Start: 2021-06-18 | End: 2021-06-18

## 2021-06-18 RX ORDER — MEPERIDINE HYDROCHLORIDE 25 MG/ML
12.5 INJECTION INTRAMUSCULAR; INTRAVENOUS; SUBCUTANEOUS AS NEEDED
Status: DISCONTINUED | OUTPATIENT
Start: 2021-06-18 | End: 2021-06-18

## 2021-06-18 RX ORDER — GLYCOPYRROLATE 0.2 MG/ML
INJECTION, SOLUTION INTRAMUSCULAR; INTRAVENOUS AS NEEDED
Status: DISCONTINUED | OUTPATIENT
Start: 2021-06-18 | End: 2021-06-18 | Stop reason: SURG

## 2021-06-18 RX ORDER — ONDANSETRON 2 MG/ML
4 INJECTION INTRAMUSCULAR; INTRAVENOUS AS NEEDED
Status: DISCONTINUED | OUTPATIENT
Start: 2021-06-18 | End: 2021-06-18

## 2021-06-18 RX ORDER — METOCLOPRAMIDE HYDROCHLORIDE 5 MG/ML
INJECTION INTRAMUSCULAR; INTRAVENOUS
Status: COMPLETED
Start: 2021-06-18 | End: 2021-06-18

## 2021-06-18 RX ORDER — SODIUM CHLORIDE, SODIUM LACTATE, POTASSIUM CHLORIDE, CALCIUM CHLORIDE 600; 310; 30; 20 MG/100ML; MG/100ML; MG/100ML; MG/100ML
INJECTION, SOLUTION INTRAVENOUS CONTINUOUS
Status: DISCONTINUED | OUTPATIENT
Start: 2021-06-18 | End: 2021-06-18

## 2021-06-18 RX ORDER — HYDROCODONE BITARTRATE AND ACETAMINOPHEN 5; 325 MG/1; MG/1
1 TABLET ORAL AS NEEDED
Status: DISCONTINUED | OUTPATIENT
Start: 2021-06-18 | End: 2021-06-18

## 2021-06-18 RX ORDER — DEXAMETHASONE SODIUM PHOSPHATE 4 MG/ML
4 VIAL (ML) INJECTION AS NEEDED
Status: DISCONTINUED | OUTPATIENT
Start: 2021-06-18 | End: 2021-06-18

## 2021-06-18 RX ORDER — INSULIN ASPART 100 [IU]/ML
INJECTION, SOLUTION INTRAVENOUS; SUBCUTANEOUS ONCE
Status: DISCONTINUED | OUTPATIENT
Start: 2021-06-18 | End: 2021-06-18

## 2021-06-18 RX ORDER — HYDROCODONE BITARTRATE AND ACETAMINOPHEN 5; 325 MG/1; MG/1
1 TABLET ORAL AS NEEDED
Status: COMPLETED | OUTPATIENT
Start: 2021-06-18 | End: 2021-06-18

## 2021-06-18 RX ORDER — PHENAZOPYRIDINE HYDROCHLORIDE 200 MG/1
200 TABLET, FILM COATED ORAL ONCE
Status: COMPLETED | OUTPATIENT
Start: 2021-06-18 | End: 2021-06-18

## 2021-06-18 RX ORDER — HYDROCODONE BITARTRATE AND ACETAMINOPHEN 5; 325 MG/1; MG/1
2 TABLET ORAL AS NEEDED
Status: DISCONTINUED | OUTPATIENT
Start: 2021-06-18 | End: 2021-06-18

## 2021-06-18 RX ORDER — HYDROMORPHONE HYDROCHLORIDE 1 MG/ML
INJECTION, SOLUTION INTRAMUSCULAR; INTRAVENOUS; SUBCUTANEOUS
Status: COMPLETED
Start: 2021-06-18 | End: 2021-06-18

## 2021-06-18 RX ORDER — HYDROCODONE BITARTRATE AND ACETAMINOPHEN 5; 325 MG/1; MG/1
2 TABLET ORAL AS NEEDED
Status: COMPLETED | OUTPATIENT
Start: 2021-06-18 | End: 2021-06-18

## 2021-06-18 RX ORDER — ONDANSETRON 2 MG/ML
INJECTION INTRAMUSCULAR; INTRAVENOUS AS NEEDED
Status: DISCONTINUED | OUTPATIENT
Start: 2021-06-18 | End: 2021-06-18 | Stop reason: SURG

## 2021-06-18 RX ORDER — LIDOCAINE HYDROCHLORIDE 10 MG/ML
INJECTION, SOLUTION EPIDURAL; INFILTRATION; INTRACAUDAL; PERINEURAL AS NEEDED
Status: DISCONTINUED | OUTPATIENT
Start: 2021-06-18 | End: 2021-06-18 | Stop reason: SURG

## 2021-06-18 RX ORDER — CEFAZOLIN SODIUM/WATER 2 G/20 ML
2 SYRINGE (ML) INTRAVENOUS ONCE
Status: COMPLETED | OUTPATIENT
Start: 2021-06-18 | End: 2021-06-18

## 2021-06-18 RX ORDER — HYDROMORPHONE HYDROCHLORIDE 1 MG/ML
0.4 INJECTION, SOLUTION INTRAMUSCULAR; INTRAVENOUS; SUBCUTANEOUS EVERY 5 MIN PRN
Status: DISCONTINUED | OUTPATIENT
Start: 2021-06-18 | End: 2021-06-18

## 2021-06-18 RX ORDER — KETOROLAC TROMETHAMINE 30 MG/ML
INJECTION, SOLUTION INTRAMUSCULAR; INTRAVENOUS AS NEEDED
Status: DISCONTINUED | OUTPATIENT
Start: 2021-06-18 | End: 2021-06-18 | Stop reason: SURG

## 2021-06-18 RX ORDER — ONDANSETRON 8 MG/1
8 TABLET, ORALLY DISINTEGRATING ORAL EVERY 8 HOURS PRN
Qty: 10 TABLET | Refills: 1 | Status: SHIPPED | OUTPATIENT
Start: 2021-06-18 | End: 2021-11-03

## 2021-06-18 RX ORDER — CELECOXIB 200 MG/1
200 CAPSULE ORAL 2 TIMES DAILY
Status: ON HOLD | COMMUNITY
End: 2021-06-18

## 2021-06-18 RX ORDER — DEXAMETHASONE SODIUM PHOSPHATE 4 MG/ML
VIAL (ML) INJECTION AS NEEDED
Status: DISCONTINUED | OUTPATIENT
Start: 2021-06-18 | End: 2021-06-18 | Stop reason: SURG

## 2021-06-18 RX ORDER — BUPIVACAINE HYDROCHLORIDE 5 MG/ML
INJECTION, SOLUTION EPIDURAL; INTRACAUDAL AS NEEDED
Status: DISCONTINUED | OUTPATIENT
Start: 2021-06-18 | End: 2021-06-18 | Stop reason: HOSPADM

## 2021-06-18 RX ADMIN — LIDOCAINE HYDROCHLORIDE 200 MG: 10 INJECTION, SOLUTION EPIDURAL; INFILTRATION; INTRACAUDAL; PERINEURAL at 10:30:00

## 2021-06-18 RX ADMIN — GLYCOPYRROLATE 0.8 MG: 0.2 INJECTION, SOLUTION INTRAMUSCULAR; INTRAVENOUS at 12:37:00

## 2021-06-18 RX ADMIN — ROCURONIUM BROMIDE 50 MG: 10 INJECTION, SOLUTION INTRAVENOUS at 10:33:00

## 2021-06-18 RX ADMIN — ROCURONIUM BROMIDE 20 MG: 10 INJECTION, SOLUTION INTRAVENOUS at 11:59:00

## 2021-06-18 RX ADMIN — NEOSTIGMINE METHYLSULFATE 5 MG: 1 INJECTION INTRAVENOUS at 12:37:00

## 2021-06-18 RX ADMIN — SODIUM CHLORIDE, SODIUM LACTATE, POTASSIUM CHLORIDE, CALCIUM CHLORIDE: 600; 310; 30; 20 INJECTION, SOLUTION INTRAVENOUS at 12:56:00

## 2021-06-18 RX ADMIN — ROCURONIUM BROMIDE 20 MG: 10 INJECTION, SOLUTION INTRAVENOUS at 11:05:00

## 2021-06-18 RX ADMIN — ONDANSETRON 4 MG: 2 INJECTION INTRAMUSCULAR; INTRAVENOUS at 12:29:00

## 2021-06-18 RX ADMIN — CEFAZOLIN SODIUM/WATER 2 G: 2 G/20 ML SYRINGE (ML) INTRAVENOUS at 10:41:00

## 2021-06-18 RX ADMIN — KETOROLAC TROMETHAMINE 30 MG: 30 INJECTION, SOLUTION INTRAMUSCULAR; INTRAVENOUS at 12:29:00

## 2021-06-18 RX ADMIN — DEXAMETHASONE SODIUM PHOSPHATE 8 MG: 4 MG/ML VIAL (ML) INJECTION at 11:13:00

## 2021-06-18 NOTE — ANESTHESIA PREPROCEDURE EVALUATION
PRE-OP EVALUATION    Patient Name: Laurel Ragland    Admit Diagnosis: MENORRHAGIA    Pre-op Diagnosis: MENORRHAGIA    TOTAL LAPAROSCOPIC HYSTERECTOMY, BILATERAL SALPINGECTOMY    Anesthesia Procedure: TOTAL LAPAROSCOPIC HYSTERECTOMY, BILATERAL SALPINGECTOMY ( mouth every morning before breakfast. On an empty stomach, Disp: 90 tablet, Rfl: 0, More than a month at Unknown time        Allergies: Patient has no known allergies. Anesthesia Evaluation    Patient summary reviewed.     Anesthetic Complications  (+) 06/08/2021     Lab Results   Component Value Date     06/02/2021    K 4.1 06/02/2021     06/02/2021    CO2 22.0 06/02/2021    BUN 8 06/02/2021    CREATSERUM 0.56 06/02/2021     (H) 06/02/2021    CA 8.6 06/02/2021            Airway      M

## 2021-06-18 NOTE — OPERATIVE REPORT
Pre-op Diagnosis: Uterine fibroids, menorrhagia, Chronic anemia    Post-op Diagnosis: Same as above    Procedure:  Total Laparoscopic Hysterectomy, bilateral salpingectomy,  Lysis of adhesions, cystoscopy    Surgeon: Dr. Gera Brand  Assistant: Kojo Palma pressure, insufflating with CO2 gas. Once adequate pneumoperitoneum was obtained, the Veress needle was then removed and replaced by a 5 mm trocar and sleeve. Survey of patient's upper abdomen and pelvis revealed the above-mentioned findings.   The patient completely free. The uterine specimen was then delivered through the vagina. The specimen was then sent to Pathology. Kerlix-stuffed glove was then placed into the vagina to help maintain pneumoperitoneum.   At this time, the vaginal cuff angles were

## 2021-06-18 NOTE — ANESTHESIA PROCEDURE NOTES
Airway  Date/Time: 6/18/2021 10:35 AM  Urgency: elective      General Information and Staff    Patient location during procedure: OR  Anesthesiologist: Kirit Kohli MD  Performed: anesthesiologist     Indications and Patient Condition  Indications for air

## 2021-06-18 NOTE — INTERVAL H&P NOTE
Pre-op Diagnosis: MENORRHAGIA    The above referenced H&P was reviewed by Phoebe Grullon MD on 6/18/2021, the patient was examined and no significant changes have occurred in the patient's condition since the H&P was performed.   I discussed with the patie

## 2021-06-18 NOTE — ANESTHESIA POSTPROCEDURE EVALUATION
[address] [facility] Patient Status:  Hospital Outpatient Surgery   Age/Gender 41year old female MRN [id number]   Location [address] [facility] Attending Harlan Medeiros MD   Hosp Day # 0 PCP Michele Wood MD

## 2021-06-18 NOTE — BRIEF OP NOTE
Pre-Operative Diagnosis: MENORRHAGIA     Post-Operative Diagnosis: MENORRHAGIA      Procedure Performed:   TOTAL LAPAROSCOPIC HYSTERECTOMY, BILATERAL SALPINGECTOMY    Surgeon(s) and Role:     Burgess Arin MD - Primary    Assistant(s):  Surgical [facility]

## 2021-10-28 ENCOUNTER — HOSPITAL ENCOUNTER (EMERGENCY)
Facility: HOSPITAL | Age: 46
Discharge: HOME | End: 2021-10-28
Attending: STUDENT IN AN ORGANIZED HEALTH CARE EDUCATION/TRAINING PROGRAM
Payer: COMMERCIAL

## 2021-10-28 VITALS
SYSTOLIC BLOOD PRESSURE: 154 MMHG | HEIGHT: 62 IN | BODY MASS INDEX: 49.5 KG/M2 | RESPIRATION RATE: 18 BRPM | WEIGHT: 269 LBS | OXYGEN SATURATION: 99 % | HEART RATE: 80 BPM | DIASTOLIC BLOOD PRESSURE: 74 MMHG | TEMPERATURE: 100 F

## 2021-10-28 DIAGNOSIS — I10 ASYMPTOMATIC HYPERTENSION: Primary | ICD-10-CM

## 2021-10-28 PROCEDURE — 99283 EMERGENCY DEPT VISIT LOW MDM: CPT

## 2021-10-28 RX ORDER — METOPROLOL SUCCINATE 25 MG/1
25 TABLET, EXTENDED RELEASE ORAL DAILY
Qty: 30 TABLET | Refills: 0 | Status: SHIPPED | OUTPATIENT
Start: 2021-10-28 | End: 2021-12-01

## 2021-10-28 NOTE — ED PROVIDER NOTES
Patient Seen in: [facility] Emergency Department      History   Patient presents with:  Hypertension    Stated Complaint: 14week postop hysterectomy appt found to have hypertension 229/112 in office    Subjective:   HPI    Patient is a 28-year-old f Breech- SROM-cerclage at 15 weeks   • CHOLECYSTECTOMY     • COLPOSCOPY, CERVIX W/UPPER ADJACENT VAGINA; [facility]?    • D & C      x 3   • DILATION/CURETTAGE,DIAGNOSTIC     • EXCISIONAL BIOSPY RIGHT  11/2018    benign   • HYSTEROSCOP Neck: Normal range of motion and full passive range of motion without pain. Neck supple. Cardiovascular: Normal rate, regular rhythm, normal heart sounds and intact distal pulses.     Pulmonary/Chest: Effort normal and breath sounds normal, no tendernes 0

## 2021-10-28 NOTE — ED INITIAL ASSESSMENT (HPI)
Pt was seen in PCP office PTA; pt had a systolic in the 112'D and was brought here by a pieter. Pt arrives denying any symptoms with a bp of 181/82.

## 2021-11-03 PROBLEM — D21.9 FIBROIDS: Status: RESOLVED | Noted: 2021-06-18 | Resolved: 2021-11-03

## 2025-01-24 NOTE — PACU NOTE
Have attempted to locate patient's navv blue fabric headband that she states she wore into the OR today. Patient is very self conscious about covering an large head incision and associated hair loss around scalp incision.  Contacted the OR room and also had beefy red

## 2025-02-14 ENCOUNTER — HOSPITAL ENCOUNTER (EMERGENCY)
Facility: HOSPITAL | Age: 50
Discharge: HOME | End: 2025-02-15
Attending: EMERGENCY MEDICINE
Payer: COMMERCIAL

## 2025-02-14 ENCOUNTER — APPOINTMENT (OUTPATIENT)
Dept: CT IMAGING | Facility: HOSPITAL | Age: 50
End: 2025-02-14
Attending: EMERGENCY MEDICINE
Payer: COMMERCIAL

## 2025-02-14 VITALS
OXYGEN SATURATION: 97 % | HEIGHT: 61 IN | RESPIRATION RATE: 18 BRPM | WEIGHT: 260 LBS | TEMPERATURE: 98 F | HEART RATE: 88 BPM | BODY MASS INDEX: 49.09 KG/M2 | SYSTOLIC BLOOD PRESSURE: 145 MMHG | DIASTOLIC BLOOD PRESSURE: 69 MMHG

## 2025-02-14 DIAGNOSIS — I10 HYPERTENSION, UNSPECIFIED TYPE: ICD-10-CM

## 2025-02-14 DIAGNOSIS — M43.6 TORTICOLLIS: Primary | ICD-10-CM

## 2025-02-14 LAB
ALBUMIN SERPL-MCNC: 4.5 G/DL (ref 3.2–4.8)
ALBUMIN/GLOB SERPL: 1.4 {RATIO} (ref 1–2)
ALP LIVER SERPL-CCNC: 123 U/L
ALT SERPL-CCNC: 19 U/L
ANION GAP SERPL CALC-SCNC: 8 MMOL/L (ref 0–18)
AST SERPL-CCNC: 18 U/L (ref ?–34)
BASOPHILS # BLD AUTO: 0.06 X10(3) UL (ref 0–0.2)
BASOPHILS NFR BLD AUTO: 0.5 %
BILIRUB SERPL-MCNC: 0.3 MG/DL (ref 0.3–1.2)
BUN BLD-MCNC: 11 MG/DL (ref 9–23)
CALCIUM BLD-MCNC: 9.2 MG/DL (ref 8.7–10.6)
CHLORIDE SERPL-SCNC: 104 MMOL/L (ref 98–112)
CO2 SERPL-SCNC: 26 MMOL/L (ref 21–32)
CREAT BLD-MCNC: 0.51 MG/DL
EGFRCR SERPLBLD CKD-EPI 2021: 115 ML/MIN/1.73M2 (ref 60–?)
EOSINOPHIL # BLD AUTO: 0.3 X10(3) UL (ref 0–0.7)
EOSINOPHIL NFR BLD AUTO: 2.5 %
ERYTHROCYTE [DISTWIDTH] IN BLOOD BY AUTOMATED COUNT: 14.1 %
GLOBULIN PLAS-MCNC: 3.2 G/DL (ref 2–3.5)
GLUCOSE BLD-MCNC: 97 MG/DL (ref 70–99)
HCT VFR BLD AUTO: 42.1 %
HGB BLD-MCNC: 14.3 G/DL
IMM GRANULOCYTES # BLD AUTO: 0.07 X10(3) UL (ref 0–1)
IMM GRANULOCYTES NFR BLD: 0.6 %
LYMPHOCYTES # BLD AUTO: 2.52 X10(3) UL (ref 1–4)
LYMPHOCYTES NFR BLD AUTO: 20.6 %
MCH RBC QN AUTO: 29.2 PG (ref 26–34)
MCHC RBC AUTO-ENTMCNC: 34 G/DL (ref 31–37)
MCV RBC AUTO: 85.9 FL
MONOCYTES # BLD AUTO: 0.87 X10(3) UL (ref 0.1–1)
MONOCYTES NFR BLD AUTO: 7.1 %
NEUTROPHILS # BLD AUTO: 8.41 X10 (3) UL (ref 1.5–7.7)
NEUTROPHILS # BLD AUTO: 8.41 X10(3) UL (ref 1.5–7.7)
NEUTROPHILS NFR BLD AUTO: 68.7 %
OSMOLALITY SERPL CALC.SUM OF ELEC: 285 MOSM/KG (ref 275–295)
PLATELET # BLD AUTO: 303 10(3)UL (ref 150–450)
POTASSIUM SERPL-SCNC: 3.9 MMOL/L (ref 3.5–5.1)
PROT SERPL-MCNC: 7.7 G/DL (ref 5.7–8.2)
RBC # BLD AUTO: 4.9 X10(6)UL
SODIUM SERPL-SCNC: 138 MMOL/L (ref 136–145)
TROPONIN I SERPL HS-MCNC: <3 NG/L
WBC # BLD AUTO: 12.2 X10(3) UL (ref 4–11)

## 2025-02-14 PROCEDURE — 99285 EMERGENCY DEPT VISIT HI MDM: CPT

## 2025-02-14 PROCEDURE — 80053 COMPREHEN METABOLIC PANEL: CPT | Performed by: EMERGENCY MEDICINE

## 2025-02-14 PROCEDURE — 70498 CT ANGIOGRAPHY NECK: CPT | Performed by: EMERGENCY MEDICINE

## 2025-02-14 PROCEDURE — 85025 COMPLETE CBC W/AUTO DIFF WBC: CPT

## 2025-02-14 PROCEDURE — 84484 ASSAY OF TROPONIN QUANT: CPT | Performed by: EMERGENCY MEDICINE

## 2025-02-14 PROCEDURE — 80053 COMPREHEN METABOLIC PANEL: CPT

## 2025-02-14 PROCEDURE — 85025 COMPLETE CBC W/AUTO DIFF WBC: CPT | Performed by: EMERGENCY MEDICINE

## 2025-02-14 PROCEDURE — 96375 TX/PRO/DX INJ NEW DRUG ADDON: CPT

## 2025-02-14 PROCEDURE — 96374 THER/PROPH/DIAG INJ IV PUSH: CPT

## 2025-02-14 PROCEDURE — 93010 ELECTROCARDIOGRAM REPORT: CPT

## 2025-02-14 PROCEDURE — 93005 ELECTROCARDIOGRAM TRACING: CPT

## 2025-02-14 PROCEDURE — 70496 CT ANGIOGRAPHY HEAD: CPT | Performed by: EMERGENCY MEDICINE

## 2025-02-14 RX ORDER — ONDANSETRON 2 MG/ML
4 INJECTION INTRAMUSCULAR; INTRAVENOUS ONCE
Status: COMPLETED | OUTPATIENT
Start: 2025-02-14 | End: 2025-02-14

## 2025-02-14 RX ORDER — HYDRALAZINE HYDROCHLORIDE 20 MG/ML
10 INJECTION INTRAMUSCULAR; INTRAVENOUS ONCE
Status: COMPLETED | OUTPATIENT
Start: 2025-02-14 | End: 2025-02-14

## 2025-02-14 RX ORDER — MORPHINE SULFATE 4 MG/ML
4 INJECTION, SOLUTION INTRAMUSCULAR; INTRAVENOUS ONCE
Status: COMPLETED | OUTPATIENT
Start: 2025-02-14 | End: 2025-02-14

## 2025-02-14 RX ORDER — CYCLOBENZAPRINE HCL 10 MG
10 TABLET ORAL 3 TIMES DAILY PRN
Qty: 20 TABLET | Refills: 0 | Status: SHIPPED | OUTPATIENT
Start: 2025-02-14 | End: 2025-02-24

## 2025-02-14 RX ORDER — HYDROCODONE BITARTRATE AND ACETAMINOPHEN 5; 325 MG/1; MG/1
2 TABLET ORAL ONCE
Status: COMPLETED | OUTPATIENT
Start: 2025-02-14 | End: 2025-02-15

## 2025-02-15 LAB
ATRIAL RATE: 85 BPM
P AXIS: 50 DEGREES
P-R INTERVAL: 136 MS
Q-T INTERVAL: 382 MS
QRS DURATION: 72 MS
QTC CALCULATION (BEZET): 454 MS
R AXIS: 53 DEGREES
T AXIS: 15 DEGREES
VENTRICULAR RATE: 85 BPM

## 2025-02-15 NOTE — ED PROVIDER NOTES
Patient Seen in: [facility] Emergency Department      History     Chief Complaint   Patient presents with    Headache    Hypertension     Stated Complaint: PT ARRIVED WITH HA & NECK PAIN SINCE YESTERDAY -PAIN RADIATES DOWN R ARM; VS: 1*    Subjective:   HPI      Patient is a 49-year-old female presents to ED for evaluation of right-sided neck and head pain.  Symptoms started yesterday upon waking.  She has pain that is sometimes relieved with Motrin but then comes back at the base of her head and into her neck.  She states that is worse with movement.  She has nausea.  She denies any vertigo or vision changes.  Her whole body feels weak.  No focal weakness, numbness or tingling.  She denies any chest pain or shortness of breath.  She has history of hypertension.  She is on lisinopril-hydrochlorothiazide and metoprolol.  She has been compliant with these.  Her blood pressure was elevated a couple weeks ago in the 170s but has not taken until today now blood pressure today in the 190s.  Patient denies fevers.    Objective:     Past Medical History:    Anesthesia complication    passed out post surgery    Blood disorder    ANEMIC- LAST WEEK/   THRU 6-2    RECIVED X3 UNITS BLOOD    Chronic rhinitis    Depression    SOME    Difficult intubation    was told mouth is too small,old anesthesia record under media    Esophageal reflux    Essential hypertension    High blood pressure    History of blood transfusion    6-1-21 AND 6-2-21 X3 UNITS    Migraine    OTHER DISEASES    Multiple fetal loss x 3    Ovarian cyst    PONV (postoperative nausea and vomiting)    Shortness of breath    strange cough related to GERD, no o2    Unspecified sleep apnea    AHI of 5, O2 holly of 90%--         no cpap    Visual impairment    glasses              Past Surgical History:   Procedure Laterality Date    Benign biopsy right  2017    benign fibrocystic changes          Breech- SROM-cerclage at 15 weeks     Cholecystectomy      Colposcopy, cervix w/upper adjacent vagina; w/endocervical curettage  1999?    D & c      x 3    Dilation/curettage,diagnostic      Excisional biospy right  11/2018    benign    Hysteroscopy,lysis intrauter adhsns  3/21/12    with BAF and Cholkeri- multiple myomectomy    Laparoscopic cholecystectomy  10/30/2013    Procedure: LAPAROSCOPIC CHOLECYSTECTOMY;  Surgeon: Charanjit Bhagat;  Location: [facility] MAIN OR    Needle biopsy left  3/1/16    U/S guided bx (4:00, 5cmfn)    Other surgical history      age 7 and 9 removal of something  on head    Other surgical history  11/26/2018    Wire loc exc right breast mass    Tubal ligation                  Social History     Socioeconomic History    Marital status:    Tobacco Use    Smoking status: Never    Smokeless tobacco: Never   Vaping Use    Vaping status: Never Used   Substance and Sexual Activity    Alcohol use: No    Drug use: No    Sexual activity: Yes     Partners: Male     Birth control/protection: Tubal Ligation                  Physical Exam     ED Triage Vitals   BP 02/14/25 2031 (!) 190/90   Pulse 02/14/25 1833 85   Resp 02/14/25 1833 16   Temp 02/14/25 1833 98 °F (36.7 °C)   Temp src 02/14/25 1833 Temporal   SpO2 02/14/25 1833 97 %   O2 Device 02/14/25 2031 None (Room air)       Current Vitals:   Vital Signs  BP: 145/69  Pulse: 88  Resp: 18  Temp: 98 °F (36.7 °C)  Temp src: Temporal  MAP (mmHg): 86    Oxygen Therapy  SpO2: 97 %  O2 Device: None (Room air)        Physical Exam  GENERAL: No acute distress, Well appearing and non-toxic, Alert and oriented X 3   HEENT: Normocephalic, atraumatic.  Moist mucous membranes.  Pupils equal round reactive to light accommodation, extraocular motion is intact, sclerae white, conjunctiva is pink.  Oropharynx is unremarkable, no exudate.  NECK: Supple, trachea midline, no lymphadenopathy.  Patient has reproducible right-sided paracervical tenderness to palpation.  Pain worsened by movement of the  neck  LUNG: Lungs clear to auscultation bilaterally, no wheezing, no rales, no rhonchi.  CARDIOVASCULAR: Regular rate and rhythm.  Normal S1S2.  No S3S4 or murmur.  ABDOMEN: Bowel sounds are present. Soft, nontender, nondistended, no pulsatile masses.    MUSCULOSKELETAL: No calf tenderness.  No clubbing, cyanosis, or edema.  Dorsalis pedis and posterior tibial pulses present  SKIN EXAMINATIoN: Warm and dry with normal appearance.  No rashes or lesions.  NEUROLOGICAL:  Awake,  Motor strength 5/5 all groups.  normal sensation.  Cranial nerves grossly intact II-XII.  Speech intact.    ED Course     Labs Reviewed   CBC WITH DIFFERENTIAL WITH PLATELET - Abnormal; Notable for the following components:       Result Value    WBC 12.2 (*)     Neutrophil Absolute Prelim 8.41 (*)     Neutrophil Absolute 8.41 (*)     All other components within normal limits   COMP METABOLIC PANEL (14) - Abnormal; Notable for the following components:    Creatinine 0.51 (*)     Alkaline Phosphatase 123 (*)     All other components within normal limits   TROPONIN I HIGH SENSITIVITY - Normal   RAINBOW DRAW BLUE   White blood cell count 12.2.  EKG    Rate, intervals and axes as noted on EKG Report.  Rate: 85  Rhythm: Sinus Rhythm  Reading: No acute changes                I personally reviewd CT images of head and independent interpretation shows no acute bleed.  I also viewed formal radiology report as read by radiology with findings below:  CTA BRAIN + CTA CAROTIDS (CPT=70496/52324)    Result Date: 2/14/2025  PROCEDURE:  CTA BRAIN + CTA CAROTIDS (CPT=70496/98377)  COMPARISON:  [facility], CT, CT BRAIN OR HEAD (13031), 7/07/2018, 11:40 AM.  INDICATIONS:  PT ARRIVED WITH HA & NECK PAIN SINCE YESTERDAY -PAIN RADIATES DOWN R ARM; VS: 185/93.81.18.97%RA PT DECLINED EMS AND WILL BE ARRIVING BY FAMILY CAR/SPOUSE  TECHNIQUE:  CT angiography of the head and neck were obtained with non-ionic contrast.  3D volume rendering images were obtained by the  technologist as well as the radiologist on an independent workstation.  Multiplanar 3D reformatted imaging including multiplanar MIP images were obtained.  Curved planar reformats were performed through the carotid and vertebral arteries. All measurements obtained in this exam were performed using NASCET criteria.  Dose reduction techniques were used. Dose information is transmitted to the ACR (American College of Radiology) NRDR (National Radiology Data Registry) which includes the Dose Index Registry.  PATIENT STATED HISTORY:(As transcribed by Technologist)  Patient presents right sided back and head pain.   CONTRAST USED:  75cc of Isovue 370  FINDINGS:  VASCULATURE:  No significant stenosis.  No visible aneurysm or vascular malformation. VENTRICLES:  Normal for age.  No enlargement or displacement. CEREBRUM:  Normal for age.  No excessive atrophy, mass, or hemorrhage, or abnormal enhancement. CEREBELLUM:  Normal for age.  No excessive atrophy, mass, or hemorrhage, or abnormal enhancement. BRAINSTEM:  Normal for age.  No excessive atrophy, mass, or hemorrhage, or abnormal enhancement. BASAL CISTERNS:  No subarachnoid hemorrhage or effacement.  SKULL:  Negative.   LEFT INTERNAL CAROTID:  No hemodynamically significant stenosis or dissection. EXTERNAL CAROTID:  No hemodynamically significant stenosis or dissection COMMON CAROTID:  No hemodynamically significant stenosis or dissection VERTEBRAL:  No hemodynamically significant stenosis or dissection  RIGHT INTERNAL CAROTID:   No hemodynamically significant stenosis or dissection EXTERNAL CAROTID:    No hemodynamically significant stenosis or dissection COMMON CAROTID:   No hemodynamically significant stenosis or dissection VERTEBRAL:   No hemodynamically significant stenosis or dissection  OTHER:  The visualized soft tissues of the neck are also unremarkable.              CONCLUSION:   1. No large vessel occlusion, hemodynamically significant stenosis, dissection, or  aneurysm of the major arterial vasculature of the head or neck.  2. No acute intracranial process identified.    LOCATION:  [facility]   Dictated by (CST): Shannan Ac MD on 2/14/2025 at 11:30 PM     Finalized by (CST): Shannan Ac MD on 2/14/2025 at 11:39 PM            Medications   hydrALAzine (Apresoline) 20 mg/mL injection 10 mg (10 mg Intravenous Given 2/14/25 2142)   morphINE PF 4 MG/ML injection 4 mg (4 mg Intravenous Given 2/14/25 2142)   ondansetron (Zofran) 4 MG/2ML injection 4 mg (4 mg Intravenous Given 2/14/25 2143)   iopamidol 76% (ISOVUE-370) injection for power injector (75 mL Intravenous Given 2/14/25 2254)   HYDROcodone-acetaminophen (Norco) 5-325 MG per tab 2 tablet (2 tablets Oral Given 2/15/25 0006)            MDM      Patient is a 49-year-old female presents to ED for evaluation of right-sided neck pain upon waking yesterday.  Patient also with elevated blood pressure.  Differential torticollis, carotid or vertebral dissection.  She has a normal neurologic exam.  She underwent laboratory testing showing low blood cell count of 12.2.  EKG normal.  CT angiogram of the head and neck performed showing no LVO.  Patient given hydralazine for elevated blood pressure systolic of 190s improved to 140s after.  Patient symptoms consistent with likely torticollis.  Patient we given Flexeril for home.      Patient was screened and evaluated during this visit.   As a treating physician attending to the patient, I determined, within reasonable clinical confidence and prior to discharge, that an emergency medical condition was not or was no longer present.  There was no indication for further evaluation, treatment or admission on an emergency basis.  Comprehensive verbal and written discharge and follow-up instructions were provided to help prevent relapse or worsening.  Patient was instructed to follow-up with her primary care provider for further evaluation and treatment, but to return immediately to the ER for  worsening, concerning, new, changing or persisting symptoms.  I discussed the case with the patient and they had no questions, complaints, or concerns.  Patient felt comfortable going home.            Medical Decision Making      Disposition and Plan     Clinical Impression:  1. Torticollis    2. Hypertension, unspecified type         Disposition:  Discharge  2/14/2025 11:54 pm    Follow-up:  Tori Chow DO  [address]  [address]  [facility] [address]  [phone]    Follow up  As needed          Medications Prescribed:  Discharge Medication List as of 2/15/2025 12:08 AM        START taking these medications    Details   cyclobenzaprine 10 MG Oral Tab Take 1 tablet (10 mg total) by mouth 3 (three) times daily as needed for Muscle spasms., Normal, Disp-20 tablet, R-0                 Supplementary Documentation:

## 2025-02-15 NOTE — ED INITIAL ASSESSMENT (HPI)
Pt arrives to the ED for evaluation of head pain, a sharp pain on the right side of her hear. Pt states the pain might radiates from her neck, pt states the pain is sharp, pt states she took Tylenol but it only relieves the pain for a few minutes, pt states she cannot turn her head d/t the pain. Pt had some nausea this morning but felt better after drinking some coffee. Pt denies visual changes. Pt feels tired from pain.   Pt is very hypertensive in triage 213/135, checked twice. Pt is taking her BP meds.

## 2025-02-15 NOTE — DISCHARGE INSTRUCTIONS
Check your blood pressure 3 times daily    Follow-up with your primary doctor in 5 to 7 days for blood pressure recheck    Flexeril for muscle relaxation

## 2025-06-22 NOTE — ANESTHESIA PREPROCEDURE EVALUATION
Carine Walton (Attending) PRE-OP EVALUATION    Patient Name: Deidre Dunham    Pre-op Diagnosis: Breast mass, right [N63.10]    Procedure(s):  WIRE LOCALIZATION EXCISION RIGHT BREAST MASS    Surgeon(s) and Role:     * Rachel Curry MD - Primary    Pre-op vitals reviewed. Temp: 98. ADJACENT VAGINA; W/ENDOCERVICAL CURETTAGE  1999?    • D & C      x 3   • DILATION/CURETTAGE,DIAGNOSTIC     • HYSTEROSCOPY     • HYSTEROSCOPY,LYSIS INTRAUTER ADHSNS  3/21/12    with BAF and Cholkeri- multiple myomectomy   • LAPAROSCOPIC CHOLECYSTECTOMY N/A 1 Add Me

## (undated) DEVICE — 3M™ STERI-STRIP™ REINFORCED ADHESIVE SKIN CLOSURES, R1548, 1 IN X 5 IN (25 MM X 125 MM), 4 STRIPS/ENVELOPE: Brand: 3M™ STERI-STRIP™

## (undated) DEVICE — GAMMEX® PI HYBRID SIZE 6.5, STERILE POWDER-FREE SURGICAL GLOVE, POLYISOPRENE AND NEOPRENE BLEND: Brand: GAMMEX

## (undated) DEVICE — PLUMEPORT ACTIV LAPAROSCOPIC SMOKE FILTRATION DEVICE: Brand: PLUMEPORT ACTIVE

## (undated) DEVICE — HANDLE LIGHT ECONOMY

## (undated) DEVICE — SUTURE PDS II 0 CT-1

## (undated) DEVICE — LAPAROVUE VISIBILITY SYSTEM LAPAROSCOPIC SOLUTIONS: Brand: LAPAROVUE

## (undated) DEVICE — SUTURE MONOCRYL 4-0 PS-2

## (undated) DEVICE — VCARE LARGE, UTERINE MANIPULATOR, VAGINAL-CERVICAL-AHLUWALIA'S-RETRACTOR-ELEVATOR: Brand: VCARE

## (undated) DEVICE — SUTURE VICRYL 3-0 SH

## (undated) DEVICE — HARMONIC ACE +7 LAPAROSCOPIC SHEARS ADVANCED HEMOSTASIS 5MM DIAMETER 36CM SHAFT LENGTH  FOR USE WITH GRAY HAND PIECE ONLY: Brand: HARMONIC ACE

## (undated) DEVICE — BANDAGE ROLL,100% COTTON, 6 PLY, LARGE: Brand: KERLIX

## (undated) DEVICE — Device: Brand: PLUMEPEN

## (undated) DEVICE — SOL  .9 1000ML BTL

## (undated) DEVICE — SUTURE SILK 4-0 RB-1

## (undated) DEVICE — SOL LACT RINGERS 3000ML

## (undated) DEVICE — KENDALL SCD EXPRESS SLEEVES, KNEE LENGTH, MEDIUM: Brand: KENDALL SCD

## (undated) DEVICE — TROCAR: Brand: KII® SLEEVE

## (undated) DEVICE — INSUFFLATION NEEDLE TO ESTABLISH PNEUMOPERITONEUM.: Brand: INSUFFLATION NEEDLE

## (undated) DEVICE — SCD SLEEVE KNEE HI BLEND

## (undated) DEVICE — ADHESIVE MASTISOL 2/3CC VL

## (undated) DEVICE — SUTURE VICRYL 6-0 S-28

## (undated) DEVICE — PKS LYONS DISSECTING FORCEPS 5MM/33CM: Brand: PK TECHNOLOGY

## (undated) DEVICE — SOLUTION SURG DURA PREP HAZMAT

## (undated) DEVICE — [HIGH FLOW INSUFFLATOR,  DO NOT USE IF PACKAGE IS DAMAGED,  KEEP DRY,  KEEP AWAY FROM SUNLIGHT,  PROTECT FROM HEAT AND RADIOACTIVE SOURCES.]: Brand: PNEUMOSURE

## (undated) DEVICE — CHROMIC GUT, STERILE, ABSORBABLE SURGICAL SUTURE: Brand: ETHICON

## (undated) DEVICE — PREP BETADINE SOL 5% EYE

## (undated) DEVICE — SUTURE VICRYL 6-0 S-29

## (undated) DEVICE — TOWEL OR BLU 16X26 STRL

## (undated) DEVICE — LIGHT HANDLE

## (undated) DEVICE — GYN LAP/ROBOTIC: Brand: MEDLINE INDUSTRIES, INC.

## (undated) DEVICE — 3M™ STERI-STRIP™ REINFORCED ADHESIVE SKIN CLOSURES, R1547, 1/2 IN X 4 IN (12 MM X 100 MM), 6 STRIPS/ENVELOPE: Brand: 3M™ STERI-STRIP™

## (undated) DEVICE — EYE PACK: Brand: MEDLINE INDUSTRIES, INC.

## (undated) DEVICE — 40580 - THE PINK PAD - ADVANCED TRENDELENBURG POSITIONING KIT: Brand: 40580 - THE PINK PAD - ADVANCED TRENDELENBURG POSITIONING KIT

## (undated) DEVICE — PAD ULNAR NERVE PROTECTOR

## (undated) DEVICE — TOWEL: OR BLU 80/CS: Brand: MEDICAL ACTION INDUSTRIES

## (undated) DEVICE — TROCAR: Brand: KII FIOS FIRST ENTRY

## (undated) DEVICE — TUBING CYSTO

## (undated) DEVICE — CAUTERY OP TEMP OPTH

## (undated) DEVICE — GAMMEX® NON-LATEX PI ORTHO SIZE 8.5, STERILE POLYISOPRENE POWDER-FREE SURGICAL GLOVE: Brand: GAMMEX

## (undated) DEVICE — STERILE POLYISOPRENE POWDER-FREE SURGICAL GLOVES: Brand: PROTEXIS

## (undated) DEVICE — SOL H2O 3000ML IRRIG

## (undated) DEVICE — SUTURE VICRYL 2-0

## (undated) DEVICE — Device

## (undated) DEVICE — 3M(TM) TEGADERM(TM) TRANSPARENT FILM DRESSING FRAME STYLE 9505W: Brand: 3M™ TEGADERM™

## (undated) DEVICE — HARMONIC CURVED BLADES 5MM: Brand: HARMONIC

## (undated) DEVICE — LAPAROSCOPIC TISSUE RETRIEVAL SAC FOR USE WITH MINIMALLY INVASIVE PROCEDURES: Brand: ESPINER TISSUE RETRIEVAL SYSTEM

## (undated) DEVICE — SOL H2O 1000ML BTL

## (undated) DEVICE — BANDAGE ELASTIC ACE 6\" X-LONG

## (undated) DEVICE — APPLICATOR COTTON TIP 3 10/PK

## (undated) DEVICE — BREAST-HERNIA-PORT CDS-LF: Brand: MEDLINE INDUSTRIES, INC.

## (undated) DEVICE — CONT SPEC SURG FAXITRON WEDGE

## (undated) DEVICE — SHEET, DRAPE, SPLIT, STERILE: Brand: MEDLINE

## (undated) NOTE — MR AVS SNAPSHOT
[address] [phone]               Thank you for choosing us for your health care visit with Shamika Barajas RD.   We are glad to serve you and happy to provide you with this summary of Visit [facility] online at  [email or web address]

## (undated) NOTE — MR AVS SNAPSHOT
[place] [phone]               Thank you for choosing us for your health care visit with JAQUELINE Sierra.   We are glad to serve you and happy to provide you with this summary give up desserts. Instead, your dietitian can show you how to plan meals to suit your body. To start, learn how different foods affect blood sugar. Carbohydrates  Carbohydrates are the main source of fuel for the body. Carbohydrates raise blood sugar.  Man them instead of saturated fats is healthy for your heart.  Certain unsaturated fats can help lower triglycerides.   Less Healthy:  · Saturated fats are found in animal products, such as meat, poultry, whole milk, lard, and butter. Saturated fats raise LDL c Take 1 tablet (37.5 mg total) by mouth every morning before breakfast.   Commonly known as:  ADIPEX-P           topiramate 25 MG Tabs   Take 1 tablet (25 mg total) by mouth 2 (two) times daily. Commonly known as:   Topamax           VSL#3 Caps   Take 1 ca Eat plenty of protein, keep the fat content low Sugars:  sodas and sports drinks, candies and desserts   Eat plenty of low-fat dairy products High fat meats and dairy   Choose whole grain products Foods high in sodium   Water is best for hydration Fast liz

## (undated) NOTE — ED AVS SNAPSHOT
Devin Baleskaren   MRN: [id number]    Department:  [facility] Emergency Department   Date of Visit:  11/26/2019           Disclosure     Insurance plans vary and the physician(s) referred by the ER may not be covered by your plan.  Please contact your tell this physician (or your personal doctor if your instructions are to return to your personal doctor) about any new or lasting problems. The primary care or specialist physician will see patients referred from the [facility] Emergency Department.  Arthor Bernheim

## (undated) NOTE — LETTER
[facility]  Phone: [phone]  Right Fax: [phone]  Pikk 20 Fredy Solano RN Date: 21    Patient Name: Donald Kris  Surgery Date: 2021    CSN: [id number]  Medical Record: [id number]   :

## (undated) NOTE — LETTER
[facility]  Phone: [phone]  OUTSIDE TESTING RESULT REQUEST      TO:   Dr Allie Mackey Date: 6/7/21    FAX #: [phone]     IMPORTANT: FOR YOUR IMMEDIATE ATTENTION  Please FAX all test results listed below to:

## (undated) NOTE — LETTER
Date & Time: 11/26/2019, 4:57 PM  Patient: Taylor Dc  Encounter Provider(s):    Gabi Butler MD       To Whom It May Concern: Taylor Dc was seen and treated in our department on 11/26/2019. She can return to work on 11/28/19 .     If you have

## (undated) NOTE — MR AVS SNAPSHOT
[place] [phone]               Thank you for choosing us for your health care visit with JAQUELINE Henderson.   We are glad to serve you and happy to provide you with this summary this happens to you, try reading or listening to soothing music before you go to sleep. Make time for yourself  In today’s world, there is often too much to do in too little time. It may seem hard to make time for yourself.  But try to spend just a few min © 1586-9452 [address], [address] [place]. All rights reserved. This information is not intended as a substitute for professional medical care. Always follow your healthcare professional's instructions.              Al Call [phone] for help. [employer] is NOT to be used for urgent needs. For medical emergencies, dial 911.            Visit [facility] online at  [employer]

## (undated) NOTE — LETTER
[facility]  Phone: [phone]  Right Fax: [phone]    ADDRESSEE INFORMATION: SENDER INFORMATION:   To:   Reddy Luna MD From: Keagan Brown RN     Department: Pre-Admission Testing   Fax Number: [phone] Date:   6

## (undated) NOTE — ED AVS SNAPSHOT
Yogesh Fierro   MRN: [id number]    Department:  [facility] [place]   Date of Visit:  7/7/2018           Disclosure     Insurance plans vary and the physician(s) referred by the ER may not be covered by your plan.  Please contact yo tell this physician (or your personal doctor if your instructions are to return to your personal doctor) about any new or lasting problems. The primary care or specialist physician will see patients referred from the [facility] Emergency Department.  Alexander Sexton

## (undated) NOTE — LETTER
06/02/21    Kimberly Carrasco      To Whom It May Concern:     This letter has been written at the patient's request. The above patient was seen at [facility] for treatment of a medical condition from 6/1/2021-6/2/2021    The patient may return to work/scho

## (undated) NOTE — MR AVS SNAPSHOT
[address]  [phone]               Thank you for choosing us for your health care visit with JAQUELINE Saxena.   We are glad to serve you and happy to provide you with this summary It’s easy to react to stress by reaching for a bag of chips or a cup of coffee. This may give you a quick boost but may later drain your energy. To keep your energy level steady, eat healthy meals and snacks at home and at work. Try not to skip meals.  Stic BuPROPion HCl ER (SR) 150 MG Tb12   Take 1 tablet (150 mg total) by mouth 2 (two) times daily. Commonly known as:  WELLBUTRIN SR           Cyclobenzaprine HCl 5 MG Tabs   Take 1 tablet (5 mg total) by mouth nightly.    Commonly known as:  FLEXERIL